# Patient Record
Sex: FEMALE | Race: BLACK OR AFRICAN AMERICAN | NOT HISPANIC OR LATINO | URBAN - METROPOLITAN AREA
[De-identification: names, ages, dates, MRNs, and addresses within clinical notes are randomized per-mention and may not be internally consistent; named-entity substitution may affect disease eponyms.]

---

## 2019-02-01 ENCOUNTER — INPATIENT (INPATIENT)
Facility: HOSPITAL | Age: 64
LOS: 6 days | Discharge: HOME HEALTH | End: 2019-02-08
Attending: INTERNAL MEDICINE | Admitting: INTERNAL MEDICINE
Payer: COMMERCIAL

## 2019-02-01 VITALS
OXYGEN SATURATION: 100 % | TEMPERATURE: 99 F | HEART RATE: 79 BPM | DIASTOLIC BLOOD PRESSURE: 82 MMHG | RESPIRATION RATE: 16 BRPM | SYSTOLIC BLOOD PRESSURE: 160 MMHG

## 2019-02-01 LAB
ALBUMIN SERPL ELPH-MCNC: 4.4 G/DL — SIGNIFICANT CHANGE UP (ref 3.3–5)
ALP SERPL-CCNC: 63 U/L — SIGNIFICANT CHANGE UP (ref 40–120)
ALT FLD-CCNC: 16 U/L — SIGNIFICANT CHANGE UP (ref 4–33)
ANION GAP SERPL CALC-SCNC: 13 MMO/L — SIGNIFICANT CHANGE UP (ref 7–14)
AST SERPL-CCNC: 16 U/L — SIGNIFICANT CHANGE UP (ref 4–32)
BASOPHILS # BLD AUTO: 0.04 K/UL — SIGNIFICANT CHANGE UP (ref 0–0.2)
BASOPHILS NFR BLD AUTO: 0.3 % — SIGNIFICANT CHANGE UP (ref 0–2)
BILIRUB SERPL-MCNC: 0.3 MG/DL — SIGNIFICANT CHANGE UP (ref 0.2–1.2)
BUN SERPL-MCNC: 16 MG/DL — SIGNIFICANT CHANGE UP (ref 7–23)
CALCIUM SERPL-MCNC: 9.9 MG/DL — SIGNIFICANT CHANGE UP (ref 8.4–10.5)
CHLORIDE SERPL-SCNC: 103 MMOL/L — SIGNIFICANT CHANGE UP (ref 98–107)
CO2 SERPL-SCNC: 25 MMOL/L — SIGNIFICANT CHANGE UP (ref 22–31)
CREAT SERPL-MCNC: 0.65 MG/DL — SIGNIFICANT CHANGE UP (ref 0.5–1.3)
EOSINOPHIL # BLD AUTO: 0.05 K/UL — SIGNIFICANT CHANGE UP (ref 0–0.5)
EOSINOPHIL NFR BLD AUTO: 0.4 % — SIGNIFICANT CHANGE UP (ref 0–6)
FLU A RESULT: NOT DETECTED — SIGNIFICANT CHANGE UP
FLU A RESULT: NOT DETECTED — SIGNIFICANT CHANGE UP
FLUAV AG NPH QL: NOT DETECTED — SIGNIFICANT CHANGE UP
FLUBV AG NPH QL: NOT DETECTED — SIGNIFICANT CHANGE UP
GLUCOSE SERPL-MCNC: 165 MG/DL — HIGH (ref 70–99)
HCT VFR BLD CALC: 38.4 % — SIGNIFICANT CHANGE UP (ref 34.5–45)
HGB BLD-MCNC: 12.2 G/DL — SIGNIFICANT CHANGE UP (ref 11.5–15.5)
IMM GRANULOCYTES NFR BLD AUTO: 0.4 % — SIGNIFICANT CHANGE UP (ref 0–1.5)
LYMPHOCYTES # BLD AUTO: 1.43 K/UL — SIGNIFICANT CHANGE UP (ref 1–3.3)
LYMPHOCYTES # BLD AUTO: 11.8 % — LOW (ref 13–44)
MCHC RBC-ENTMCNC: 28 PG — SIGNIFICANT CHANGE UP (ref 27–34)
MCHC RBC-ENTMCNC: 31.8 % — LOW (ref 32–36)
MCV RBC AUTO: 88.1 FL — SIGNIFICANT CHANGE UP (ref 80–100)
MONOCYTES # BLD AUTO: 0.57 K/UL — SIGNIFICANT CHANGE UP (ref 0–0.9)
MONOCYTES NFR BLD AUTO: 4.7 % — SIGNIFICANT CHANGE UP (ref 2–14)
NEUTROPHILS # BLD AUTO: 9.95 K/UL — HIGH (ref 1.8–7.4)
NEUTROPHILS NFR BLD AUTO: 82.4 % — HIGH (ref 43–77)
NRBC # FLD: 0 K/UL — LOW (ref 25–125)
PLATELET # BLD AUTO: 275 K/UL — SIGNIFICANT CHANGE UP (ref 150–400)
PMV BLD: 11 FL — SIGNIFICANT CHANGE UP (ref 7–13)
POTASSIUM SERPL-MCNC: 3.3 MMOL/L — LOW (ref 3.5–5.3)
POTASSIUM SERPL-SCNC: 3.3 MMOL/L — LOW (ref 3.5–5.3)
PROT SERPL-MCNC: 7.6 G/DL — SIGNIFICANT CHANGE UP (ref 6–8.3)
RBC # BLD: 4.36 M/UL — SIGNIFICANT CHANGE UP (ref 3.8–5.2)
RBC # FLD: 14.9 % — HIGH (ref 10.3–14.5)
RSV RESULT: SIGNIFICANT CHANGE UP
RSV RNA RESP QL NAA+PROBE: SIGNIFICANT CHANGE UP
SODIUM SERPL-SCNC: 141 MMOL/L — SIGNIFICANT CHANGE UP (ref 135–145)
WBC # BLD: 12.09 K/UL — HIGH (ref 3.8–10.5)
WBC # FLD AUTO: 12.09 K/UL — HIGH (ref 3.8–10.5)

## 2019-02-01 RX ORDER — MECLIZINE HCL 12.5 MG
25 TABLET ORAL ONCE
Qty: 0 | Refills: 0 | Status: COMPLETED | OUTPATIENT
Start: 2019-02-01 | End: 2019-02-01

## 2019-02-01 RX ORDER — ONDANSETRON 8 MG/1
4 TABLET, FILM COATED ORAL ONCE
Qty: 0 | Refills: 0 | Status: COMPLETED | OUTPATIENT
Start: 2019-02-01 | End: 2019-02-01

## 2019-02-01 RX ORDER — METOCLOPRAMIDE HCL 10 MG
10 TABLET ORAL ONCE
Qty: 0 | Refills: 0 | Status: COMPLETED | OUTPATIENT
Start: 2019-02-01 | End: 2019-02-01

## 2019-02-01 RX ORDER — SODIUM CHLORIDE 9 MG/ML
1000 INJECTION INTRAMUSCULAR; INTRAVENOUS; SUBCUTANEOUS ONCE
Qty: 0 | Refills: 0 | Status: COMPLETED | OUTPATIENT
Start: 2019-02-01 | End: 2019-02-01

## 2019-02-01 RX ADMIN — ONDANSETRON 4 MILLIGRAM(S): 8 TABLET, FILM COATED ORAL at 22:08

## 2019-02-01 RX ADMIN — Medication 25 MILLIGRAM(S): at 22:08

## 2019-02-01 RX ADMIN — Medication 25 MILLIGRAM(S): at 23:48

## 2019-02-01 RX ADMIN — Medication 25 MILLIGRAM(S): at 20:20

## 2019-02-01 RX ADMIN — ONDANSETRON 4 MILLIGRAM(S): 8 TABLET, FILM COATED ORAL at 23:48

## 2019-02-01 RX ADMIN — Medication 10 MILLIGRAM(S): at 23:48

## 2019-02-01 RX ADMIN — SODIUM CHLORIDE 1000 MILLILITER(S): 9 INJECTION INTRAMUSCULAR; INTRAVENOUS; SUBCUTANEOUS at 20:21

## 2019-02-01 NOTE — ED PROVIDER NOTE - ATTENDING CONTRIBUTION TO CARE
Pt was evaluated at another hospital this AM for elevated BP that was treated with norvasc, she was discharged and was having her hair done when she felt the room spinning around her and became very nauseated and was brought here. On exam, aaox3, vitals noted. CN II-XII grossly intact, no pronator drift, normal finger to nose, 5/5 strength in all 4 ext, sensation intact, no carotid bruit, will send labs, ct, ekg, medicate and reassess.

## 2019-02-01 NOTE — ED ADULT NURSE NOTE - OBJECTIVE STATEMENT
patient alert ox3 came in c/o dizziness. not in any distress. labs done as ordered. meds given . awaiting on results and re eval

## 2019-02-01 NOTE — ED ADULT TRIAGE NOTE - CHIEF COMPLAINT QUOTE
Pt. was seen at [facility] for weakness, vertigo and HTN earlier this AM. States she felt dizzy while shopping at [employer] after being discharged. Started on Losartan today. No LOC. Denies cp, palpitations or sob.

## 2019-02-01 NOTE — ED PROVIDER NOTE - PROGRESS NOTE DETAILS
patient remains symptomatic despite multiple rounds of medications. will consult neurology. - resident Rob Corey patient remains symptomatic. will get cta head. second page to neurology. - resident Rob Corey patient seen by neuro. positive hints exam so symptoms likely peripheral. patient reports symptoms moderately improved. agrees to stay for further symptom control and MRI. - resident Rob Corey

## 2019-02-01 NOTE — ED PROVIDER NOTE - PHYSICAL EXAMINATION
General: uncomfortable appearing female, no acute distress   HEENT: PERRL, EOMI, bilateral TMs clear, oropharynx without exudate or erythema   Respiratory: normal work of breathing, lungs clear to auscultation bilaterally   Cardiac: regular rate and rhythm   Abdomen: soft, non-tender   MSk: no swelling or tenderness of lower extremities   Skin: no rashes   Neuro: A&Ox3, 5/5 strength in all extremities, no sensory deficits

## 2019-02-01 NOTE — ED ADULT NURSE NOTE - NSIMPLEMENTINTERV_GEN_ALL_ED
Implemented All Universal Safety Interventions:  [place] to call system. Call bell, personal items and telephone within reach. Instruct patient to call for assistance. Room bathroom lighting operational. Non-slip footwear when patient is off stretcher. Physically safe environment: no spills, clutter or unnecessary equipment. Stretcher in lowest position, wheels locked, appropriate side rails in place.

## 2019-02-01 NOTE — ED ADULT NURSE NOTE - CHIEF COMPLAINT QUOTE
Pt. was seen at [facility] for weakness, vertigo and HTN earlier this AM. States she felt dizzy while shopping at [employer] after being discharged. Started on Losartan today. No LOC. Denies cp, palpitations or sob.

## 2019-02-01 NOTE — ED PROVIDER NOTE - OBJECTIVE STATEMENT
63F, pmh of HTN presenting with dizziness. Patient has been feeling weak, fatigued and had body aches for the last several days. Found her blood pressure to be elevated so went to PCP who started her on Losartan. Today she felt unwell so went to [facility]. Was found to be flu negative, given IV fluids and discharged after symptoms improved. In the afternoon suddenly became dizzy (room spinning) and had vomiting. Symptoms worsen with movement. Denies fever, changes in vision, hearing loss, headache, chest pain, difficulty breathing, cough, abdominal pain, pain or burning with urination, pain or swelling in lower extremities.

## 2019-02-01 NOTE — ED PROVIDER NOTE - MEDICAL DECISION MAKING DETAILS
63F presenting with dizziness. sudden onset of symptoms, associated vomiting. body aches and general fatigue for several days. low concern for central etiology of vertigo. likely peripheral vertigo vs viral illness. plan for cbc, cmp, normal saline, symptom control. will reassess.

## 2019-02-02 DIAGNOSIS — S37.591A: Chronic | ICD-10-CM

## 2019-02-02 DIAGNOSIS — Z98.890 OTHER SPECIFIED POSTPROCEDURAL STATES: Chronic | ICD-10-CM

## 2019-02-02 LAB
APPEARANCE UR: CLEAR — SIGNIFICANT CHANGE UP
BACTERIA # UR AUTO: NEGATIVE — SIGNIFICANT CHANGE UP
BILIRUB UR-MCNC: NEGATIVE — SIGNIFICANT CHANGE UP
BLOOD UR QL VISUAL: NEGATIVE — SIGNIFICANT CHANGE UP
COLOR SPEC: SIGNIFICANT CHANGE UP
GLUCOSE UR-MCNC: NEGATIVE — SIGNIFICANT CHANGE UP
HYALINE CASTS # UR AUTO: HIGH
KETONES UR-MCNC: SIGNIFICANT CHANGE UP
LEUKOCYTE ESTERASE UR-ACNC: SIGNIFICANT CHANGE UP
NITRITE UR-MCNC: NEGATIVE — SIGNIFICANT CHANGE UP
PH UR: 6.5 — SIGNIFICANT CHANGE UP (ref 5–8)
PROT UR-MCNC: 10 — SIGNIFICANT CHANGE UP
RBC CASTS # UR COMP ASSIST: SIGNIFICANT CHANGE UP (ref 0–?)
SP GR SPEC: 1.02 — SIGNIFICANT CHANGE UP (ref 1–1.04)
SQUAMOUS # UR AUTO: SIGNIFICANT CHANGE UP
UROBILINOGEN FLD QL: NORMAL — SIGNIFICANT CHANGE UP
WBC UR QL: >50 — HIGH (ref 0–?)

## 2019-02-02 PROCEDURE — 70551 MRI BRAIN STEM W/O DYE: CPT | Mod: 26

## 2019-02-02 PROCEDURE — 70496 CT ANGIOGRAPHY HEAD: CPT | Mod: 26

## 2019-02-02 PROCEDURE — 70498 CT ANGIOGRAPHY NECK: CPT | Mod: 26

## 2019-02-02 RX ORDER — DIAZEPAM 5 MG
2 TABLET ORAL ONCE
Qty: 0 | Refills: 0 | Status: DISCONTINUED | OUTPATIENT
Start: 2019-02-02 | End: 2019-02-02

## 2019-02-02 RX ORDER — ONDANSETRON 8 MG/1
4 TABLET, FILM COATED ORAL EVERY 4 HOURS
Qty: 0 | Refills: 0 | Status: DISCONTINUED | OUTPATIENT
Start: 2019-02-02 | End: 2019-02-08

## 2019-02-02 RX ORDER — ATORVASTATIN CALCIUM 80 MG/1
80 TABLET, FILM COATED ORAL DAILY
Qty: 0 | Refills: 0 | Status: DISCONTINUED | OUTPATIENT
Start: 2019-02-02 | End: 2019-02-08

## 2019-02-02 RX ORDER — POTASSIUM CHLORIDE 20 MEQ
40 PACKET (EA) ORAL ONCE
Qty: 0 | Refills: 0 | Status: COMPLETED | OUTPATIENT
Start: 2019-02-02 | End: 2019-02-02

## 2019-02-02 RX ORDER — MECLIZINE HCL 12.5 MG
25 TABLET ORAL ONCE
Qty: 0 | Refills: 0 | Status: COMPLETED | OUTPATIENT
Start: 2019-02-02 | End: 2019-02-02

## 2019-02-02 RX ORDER — METOCLOPRAMIDE HCL 10 MG
10 TABLET ORAL ONCE
Qty: 0 | Refills: 0 | Status: COMPLETED | OUTPATIENT
Start: 2019-02-02 | End: 2019-02-02

## 2019-02-02 RX ORDER — DIAZEPAM 5 MG
5 TABLET ORAL EVERY 6 HOURS
Qty: 0 | Refills: 0 | Status: DISCONTINUED | OUTPATIENT
Start: 2019-02-02 | End: 2019-02-04

## 2019-02-02 RX ORDER — MECLIZINE HCL 12.5 MG
25 TABLET ORAL EVERY 6 HOURS
Qty: 0 | Refills: 0 | Status: DISCONTINUED | OUTPATIENT
Start: 2019-02-02 | End: 2019-02-04

## 2019-02-02 RX ORDER — LOSARTAN POTASSIUM 100 MG/1
50 TABLET, FILM COATED ORAL DAILY
Qty: 0 | Refills: 0 | Status: DISCONTINUED | OUTPATIENT
Start: 2019-02-02 | End: 2019-02-05

## 2019-02-02 RX ORDER — HYDROCHLOROTHIAZIDE 25 MG
12.5 TABLET ORAL DAILY
Qty: 0 | Refills: 0 | Status: DISCONTINUED | OUTPATIENT
Start: 2019-02-02 | End: 2019-02-05

## 2019-02-02 RX ORDER — ASPIRIN/CALCIUM CARB/MAGNESIUM 324 MG
81 TABLET ORAL DAILY
Qty: 0 | Refills: 0 | Status: DISCONTINUED | OUTPATIENT
Start: 2019-02-02 | End: 2019-02-08

## 2019-02-02 RX ORDER — DIAZEPAM 5 MG
5 TABLET ORAL ONCE
Qty: 0 | Refills: 0 | Status: DISCONTINUED | OUTPATIENT
Start: 2019-02-02 | End: 2019-02-02

## 2019-02-02 RX ORDER — METOPROLOL TARTRATE 50 MG
100 TABLET ORAL DAILY
Qty: 0 | Refills: 0 | Status: DISCONTINUED | OUTPATIENT
Start: 2019-02-02 | End: 2019-02-06

## 2019-02-02 RX ADMIN — Medication 25 MILLIGRAM(S): at 08:38

## 2019-02-02 RX ADMIN — Medication 0.5 MILLIGRAM(S): at 19:08

## 2019-02-02 RX ADMIN — Medication 10 MILLIGRAM(S): at 02:21

## 2019-02-02 RX ADMIN — Medication 5 MILLIGRAM(S): at 00:52

## 2019-02-02 RX ADMIN — Medication 2 MILLIGRAM(S): at 02:43

## 2019-02-02 RX ADMIN — Medication 12.5 MILLIGRAM(S): at 09:13

## 2019-02-02 RX ADMIN — Medication 81 MILLIGRAM(S): at 12:50

## 2019-02-02 RX ADMIN — ATORVASTATIN CALCIUM 80 MILLIGRAM(S): 80 TABLET, FILM COATED ORAL at 12:50

## 2019-02-02 RX ADMIN — LOSARTAN POTASSIUM 50 MILLIGRAM(S): 100 TABLET, FILM COATED ORAL at 09:12

## 2019-02-02 RX ADMIN — Medication 5 MILLIGRAM(S): at 21:21

## 2019-02-02 RX ADMIN — ONDANSETRON 4 MILLIGRAM(S): 8 TABLET, FILM COATED ORAL at 08:38

## 2019-02-02 RX ADMIN — Medication 40 MILLIEQUIVALENT(S): at 08:38

## 2019-02-02 RX ADMIN — Medication 100 MILLIGRAM(S): at 09:12

## 2019-02-02 RX ADMIN — Medication 25 MILLIGRAM(S): at 21:21

## 2019-02-02 RX ADMIN — Medication 25 MILLIGRAM(S): at 00:52

## 2019-02-02 RX ADMIN — Medication 0.5 MILLIGRAM(S): at 08:39

## 2019-02-02 NOTE — ED CDU PROVIDER INITIAL DAY NOTE - FAMILY HISTORY
Mother  Still living? No  Family history of Alzheimer's disease, Age at diagnosis: Age Unknown     Father  Still living? No  Family history of substance abuse, Age at diagnosis: Age Unknown

## 2019-02-02 NOTE — ED CDU PROVIDER INITIAL DAY NOTE - PSH
Other injury of one fallopian tube  sp revision procedure  S/P D&C (status post dilation and curettage)  termination of pregnancy

## 2019-02-02 NOTE — CONSULT NOTE ADULT - ASSESSMENT
63y RH F PMH HTN p/w vertigo. NIHSS 0, MRS 0. PE w/ L beating nystagmus looking in all directions, dizziness worsens when looking L, head thrust +. Patient received multiple doses of meclizine and valium without relief of symptoms. HINTs exam + would indicate that vertigo is less likely 2/2 to stroke, more likely 2/2 to peripheral cause; however, given severity of symptoms without relief in the setting of acute hypertensive episodes, would be reasonable to obtain additional imaging studies to rule out neurovascular etiology of symptoms.     Recs:  CT head w/o contrast, CTA H/N  If CT head w/o contrast negative for bleed, would give aspirin 81mg x1  Atorvastatin 80mg qD  A1c, LDL  MRI head w/o contrast  Continue valium

## 2019-02-02 NOTE — CONSULT NOTE ADULT - SUBJECTIVE AND OBJECTIVE BOX
Neurology Consult    63y RH F PMH HTN p/w vertigo. LKW 1/31 0900. As per patient, on 1/31, started feeling unwell, had headache, nausea, dizziness. Went to PCP's office where her pressure was found to be 220/120; PCP increased pt's metoprolol succinate from 50 to 100mg and gave her a dose of losartan 50mg and HCTZ 12.5 mg. On 2/1 in the AM, patient continued to feel unwell, took 100mg metoprolol succinate and went to [facility] where her BP was once again found elevated; was given norvasc(dose unknown), felt better, and was discharged. Patient then went to [facility] when she felt worsening dizziness/vertigo(room spinning around her), called an ambulance which brought her to [facility].     REVIEW OF SYSTEMS:  As above    MEDICATIONS  diazepam  Injectable 2 milliGRAM(s) IV Push Once  metoclopramide Injectable 10 milliGRAM(s) IV Push once    PMH: Hypertension    PSH:     FAMILY HISTORY:    No history of dementia, strokes, or seizures     SOCIAL HISTORY:  No history of tobacco or alcohol use     Allergies    Flagyl (Rash)    Intolerances    Vital Signs Last 24 Hrs  T(C): 36.5 (02 Feb 2019 01:01), Max: 37.2 (01 Feb 2019 18:44)  T(F): 97.7 (02 Feb 2019 01:01), Max: 98.9 (01 Feb 2019 18:44)  HR: 92 (02 Feb 2019 01:01) (79 - 92)  BP: 174/91 (02 Feb 2019 01:01) (160/82 - 174/91)  BP(mean): --  RR: 17 (02 Feb 2019 01:01) (16 - 17)  SpO2: 98% (02 Feb 2019 01:01) (98% - 100%)    Neurological Examination:    Mental Status: Patient is alert, awake, oriented X3. Patient is fluent, no dysarthria, no aphasia. Follows commands well and able to answer questions appropriately. Mood and affect normal.    Cranial Nerves: PERRL, EOMI with dizziness that worsens when looking L, L beating nystagmus when looking in all directions, no skew deviation, L head thrust positive, visual field intact, V1-V3 intact, no gross facial asymmetry, tongue/uvula midline    Not compliant w/ dony hallpike(too dizzy to sit up)    Motor Exam: No drift  Right upper extremity: 5/5  Left upper extremity: 5/5  Right lower extremity: 5/5  Left lower extremity: 5/5    Normal bulk/tone    Sensory: Intact to light touch bilaterally. No extinction    Coordination: Finger to nose intact bilaterally     Reflexes: Bilateral 2+ Biceps, Brachial, Patellar, Ankle  Plantar: Down bilateral    GENERAL: No acute distress  HEENT:  Normocephalic, atraumatic  EXTREMITIES: No edema, clubbing, cyanosis  MUSCULOSKELETAL: Normal range of motion  SKIN: No rashes    LABS:  CBC Full  -  ( 01 Feb 2019 20:05 )  WBC Count : 12.09 K/uL  Hemoglobin : 12.2 g/dL  Hematocrit : 38.4 %  Platelet Count - Automated : 275 K/uL  Mean Cell Volume : 88.1 fL  Mean Cell Hemoglobin : 28.0 pg  Mean Cell Hemoglobin Concentration : 31.8 %  Auto Neutrophil # : 9.95 K/uL  Auto Lymphocyte # : 1.43 K/uL  Auto Monocyte # : 0.57 K/uL  Auto Eosinophil # : 0.05 K/uL  Auto Basophil # : 0.04 K/uL  Auto Neutrophil % : 82.4 %  Auto Lymphocyte % : 11.8 %  Auto Monocyte % : 4.7 %  Auto Eosinophil % : 0.4 %  Auto Basophil % : 0.3 %      02-01    141  |  103  |  16  ----------------------------<  165<H>  3.3<L>   |  25  |  0.65    Ca    9.9      01 Feb 2019 20:05    TPro  7.6  /  Alb  4.4  /  TBili  0.3  /  DBili  x   /  AST  16  /  ALT  16  /  AlkPhos  63  02-01    LIVER FUNCTIONS - ( 01 Feb 2019 20:05 )  Alb: 4.4 g/dL / Pro: 7.6 g/dL / ALK PHOS: 63 u/L / ALT: 16 u/L / AST: 16 u/L / GGT: x           Hemoglobin A1C:           RADIOLOGY:  CT head:  MRI:

## 2019-02-02 NOTE — ED CDU PROVIDER INITIAL DAY NOTE - PROGRESS NOTE DETAILS
Patient states she is till having dizziness this am. States it has not improved, Exam: heart- RRR s1s2 nl  Lungs - clear bilaterally  abd - soft NT ND  extrem- no edema or cyanosis   Plan for MRI today. Will offer medication to assist with patient's claustrophobia. Await MRI results

## 2019-02-02 NOTE — CONSULT NOTE ADULT - ATTENDING COMMENTS
I performed a history and physical examination of the patient and discussed the management of the patient with the resident. I reviewed the resident's note and agree with the documented findings and plan of care with the following additions/exceptions.    DOS 2/2/19    continues to feel dizzy this morning. alert, fluent, face symmetric, limbs symmteric. nystagmus on left gaze and head thrust test with unilateral abnormal corrective saccade on left, suggesting could be peripheral etiology however with her history need to be cautious and r/o central cause like stroke. she went for mri but was too claustrophobic to get it, agree with team trying again.

## 2019-02-02 NOTE — ED CDU PROVIDER INITIAL DAY NOTE - OBJECTIVE STATEMENT
Patient is a 63 year old female who states that yesterday around 530 pm she was at the hair salon seated in the chair, when she suddenly developed dizziness. States she had to ask the staff for assistance to get to her car. while sitting in her car she states she called 911 / ambulance who brought her to the hospital ER. States she had a similar episode 1 year ago but that episode was very brief and lasted only a few minutes. It resolved on its own. States that the dizziness has been continuous since it started yesterday.

## 2019-02-02 NOTE — ED CDU PROVIDER INITIAL DAY NOTE - ATTENDING CONTRIBUTION TO CARE
64 yo F pmh of HTN presented with vertigo and dizziness. Patient has been feeling weak, fatigued and had body aches for the last several days. Found her blood pressure to be elevated so went to PCP who started her on Losartan. Was seen at [facility] earlier, was found to be flu negative, given IV fluids and discharged after symptoms improved. Vertigo worsened with associated vomiting and so she came to the ER.   In the ER K 3.3, WBC 12, labs otherwise normal. CTH, and CTA H/N obtained which were negative. she received several rounds of valium/meclizine/reglan/zofran in the ER with continued vertigo. Neuro was consulted who recommended MRI brain.   In the CDU this morning patient reports stable symptoms. K repleted. UA ordered. re-spoke to neuro, they rec'd MRI brain without contrast, no MRA. therefore I amended the MRI orders. will continue to monitor patient, medicate as needed, and follow up MRI results 62 yo F pmh of HTN presented with vertigo and dizziness. Patient has been feeling weak, fatigued and had body aches for the last several days. Found her blood pressure to be elevated so went to PCP who started her on Losartan. Was seen at [facility] earlier, was found to be flu negative, given IV fluids and discharged after symptoms improved. Vertigo worsened with associated vomiting and so she came to the ER.   In the ER K 3.3, WBC 12, labs otherwise normal. CTH, and CTA H/N obtained which were negative. she received several rounds of valium/meclizine/reglan/zofran in the ER with continued vertigo. Neuro was consulted who recommended MRI brain.   In the CDU this morning patient reports continued vertigo and nausea. vertigo is worse with eyes open, is present constantly irregardless of head movement. further meds ordered. K repleted. UA ordered. re-spoke to neuro, they rec'd MRI brain without contrast, no MRA. therefore I amended the MRI orders. will continue to monitor patient, medicate as needed, and follow up MRI results

## 2019-02-02 NOTE — ED ADULT NURSE REASSESSMENT NOTE - NS ED NURSE REASSESS COMMENT FT1
Patient asleep, appears comfortable and in no apparent distress.  Awaiting CDU consult.  Will continue to monitor patient.

## 2019-02-03 DIAGNOSIS — Z29.9 ENCOUNTER FOR PROPHYLACTIC MEASURES, UNSPECIFIED: ICD-10-CM

## 2019-02-03 DIAGNOSIS — I10 ESSENTIAL (PRIMARY) HYPERTENSION: ICD-10-CM

## 2019-02-03 DIAGNOSIS — R42 DIZZINESS AND GIDDINESS: ICD-10-CM

## 2019-02-03 DIAGNOSIS — R82.90 UNSPECIFIED ABNORMAL FINDINGS IN URINE: ICD-10-CM

## 2019-02-03 LAB
ALBUMIN SERPL ELPH-MCNC: 4 G/DL — SIGNIFICANT CHANGE UP (ref 3.3–5)
ALP SERPL-CCNC: 59 U/L — SIGNIFICANT CHANGE UP (ref 40–120)
ALT FLD-CCNC: 12 U/L — SIGNIFICANT CHANGE UP (ref 4–33)
ANION GAP SERPL CALC-SCNC: 13 MMO/L — SIGNIFICANT CHANGE UP (ref 7–14)
APPEARANCE UR: SIGNIFICANT CHANGE UP
AST SERPL-CCNC: 15 U/L — SIGNIFICANT CHANGE UP (ref 4–32)
BACTERIA # UR AUTO: NEGATIVE — SIGNIFICANT CHANGE UP
BASOPHILS # BLD AUTO: 0.04 K/UL — SIGNIFICANT CHANGE UP (ref 0–0.2)
BASOPHILS NFR BLD AUTO: 0.5 % — SIGNIFICANT CHANGE UP (ref 0–2)
BILIRUB SERPL-MCNC: 0.6 MG/DL — SIGNIFICANT CHANGE UP (ref 0.2–1.2)
BILIRUB UR-MCNC: NEGATIVE — SIGNIFICANT CHANGE UP
BLOOD UR QL VISUAL: NEGATIVE — SIGNIFICANT CHANGE UP
BUN SERPL-MCNC: 21 MG/DL — SIGNIFICANT CHANGE UP (ref 7–23)
CALCIUM SERPL-MCNC: 10.1 MG/DL — SIGNIFICANT CHANGE UP (ref 8.4–10.5)
CHLORIDE SERPL-SCNC: 102 MMOL/L — SIGNIFICANT CHANGE UP (ref 98–107)
CHOLEST SERPL-MCNC: 198 MG/DL — SIGNIFICANT CHANGE UP (ref 120–199)
CO2 SERPL-SCNC: 24 MMOL/L — SIGNIFICANT CHANGE UP (ref 22–31)
COLOR SPEC: YELLOW — SIGNIFICANT CHANGE UP
CREAT SERPL-MCNC: 0.9 MG/DL — SIGNIFICANT CHANGE UP (ref 0.5–1.3)
EOSINOPHIL # BLD AUTO: 0.06 K/UL — SIGNIFICANT CHANGE UP (ref 0–0.5)
EOSINOPHIL NFR BLD AUTO: 0.7 % — SIGNIFICANT CHANGE UP (ref 0–6)
GLUCOSE SERPL-MCNC: 117 MG/DL — HIGH (ref 70–99)
GLUCOSE UR-MCNC: NEGATIVE — SIGNIFICANT CHANGE UP
HBA1C BLD-MCNC: 6.4 % — HIGH (ref 4–5.6)
HCT VFR BLD CALC: 41.2 % — SIGNIFICANT CHANGE UP (ref 34.5–45)
HDLC SERPL-MCNC: 71 MG/DL — HIGH (ref 45–65)
HGB BLD-MCNC: 12.8 G/DL — SIGNIFICANT CHANGE UP (ref 11.5–15.5)
HYALINE CASTS # UR AUTO: HIGH
IMM GRANULOCYTES NFR BLD AUTO: 0.5 % — SIGNIFICANT CHANGE UP (ref 0–1.5)
KETONES UR-MCNC: SIGNIFICANT CHANGE UP
LEUKOCYTE ESTERASE UR-ACNC: SIGNIFICANT CHANGE UP
LIPID PNL WITH DIRECT LDL SERPL: 122 MG/DL — SIGNIFICANT CHANGE UP
LYMPHOCYTES # BLD AUTO: 2.07 K/UL — SIGNIFICANT CHANGE UP (ref 1–3.3)
LYMPHOCYTES # BLD AUTO: 25.2 % — SIGNIFICANT CHANGE UP (ref 13–44)
MCHC RBC-ENTMCNC: 27.3 PG — SIGNIFICANT CHANGE UP (ref 27–34)
MCHC RBC-ENTMCNC: 31.1 % — LOW (ref 32–36)
MCV RBC AUTO: 87.8 FL — SIGNIFICANT CHANGE UP (ref 80–100)
MONOCYTES # BLD AUTO: 0.51 K/UL — SIGNIFICANT CHANGE UP (ref 0–0.9)
MONOCYTES NFR BLD AUTO: 6.2 % — SIGNIFICANT CHANGE UP (ref 2–14)
NEUTROPHILS # BLD AUTO: 5.49 K/UL — SIGNIFICANT CHANGE UP (ref 1.8–7.4)
NEUTROPHILS NFR BLD AUTO: 66.9 % — SIGNIFICANT CHANGE UP (ref 43–77)
NITRITE UR-MCNC: NEGATIVE — SIGNIFICANT CHANGE UP
NRBC # FLD: 0 K/UL — LOW (ref 25–125)
PH UR: 6 — SIGNIFICANT CHANGE UP (ref 5–8)
PLATELET # BLD AUTO: 310 K/UL — SIGNIFICANT CHANGE UP (ref 150–400)
PMV BLD: 11.7 FL — SIGNIFICANT CHANGE UP (ref 7–13)
POTASSIUM SERPL-MCNC: 3.6 MMOL/L — SIGNIFICANT CHANGE UP (ref 3.5–5.3)
POTASSIUM SERPL-SCNC: 3.6 MMOL/L — SIGNIFICANT CHANGE UP (ref 3.5–5.3)
PROT SERPL-MCNC: 7.1 G/DL — SIGNIFICANT CHANGE UP (ref 6–8.3)
PROT UR-MCNC: 50 — SIGNIFICANT CHANGE UP
RBC # BLD: 4.69 M/UL — SIGNIFICANT CHANGE UP (ref 3.8–5.2)
RBC # FLD: 15.2 % — HIGH (ref 10.3–14.5)
RBC CASTS # UR COMP ASSIST: SIGNIFICANT CHANGE UP (ref 0–?)
SODIUM SERPL-SCNC: 139 MMOL/L — SIGNIFICANT CHANGE UP (ref 135–145)
SP GR SPEC: 1.03 — SIGNIFICANT CHANGE UP (ref 1–1.04)
SQUAMOUS # UR AUTO: SIGNIFICANT CHANGE UP
TRIGL SERPL-MCNC: 108 MG/DL — SIGNIFICANT CHANGE UP (ref 10–149)
UROBILINOGEN FLD QL: SIGNIFICANT CHANGE UP
WBC # BLD: 8.21 K/UL — SIGNIFICANT CHANGE UP (ref 3.8–10.5)
WBC # FLD AUTO: 8.21 K/UL — SIGNIFICANT CHANGE UP (ref 3.8–10.5)
WBC UR QL: HIGH (ref 0–?)
YEAST BUDDING # UR COMP ASSIST: SIGNIFICANT CHANGE UP

## 2019-02-03 PROCEDURE — 99223 1ST HOSP IP/OBS HIGH 75: CPT

## 2019-02-03 RX ORDER — CEPHALEXIN 500 MG
500 CAPSULE ORAL
Qty: 0 | Refills: 0 | Status: DISCONTINUED | OUTPATIENT
Start: 2019-02-03 | End: 2019-02-05

## 2019-02-03 RX ORDER — HEPARIN SODIUM 5000 [USP'U]/ML
5000 INJECTION INTRAVENOUS; SUBCUTANEOUS EVERY 8 HOURS
Qty: 0 | Refills: 0 | Status: DISCONTINUED | OUTPATIENT
Start: 2019-02-03 | End: 2019-02-08

## 2019-02-03 RX ADMIN — Medication 25 MILLIGRAM(S): at 06:47

## 2019-02-03 RX ADMIN — Medication 500 MILLIGRAM(S): at 06:47

## 2019-02-03 RX ADMIN — HEPARIN SODIUM 5000 UNIT(S): 5000 INJECTION INTRAVENOUS; SUBCUTANEOUS at 21:04

## 2019-02-03 RX ADMIN — Medication 5 MILLIGRAM(S): at 06:46

## 2019-02-03 RX ADMIN — ATORVASTATIN CALCIUM 80 MILLIGRAM(S): 80 TABLET, FILM COATED ORAL at 12:08

## 2019-02-03 RX ADMIN — Medication 500 MILLIGRAM(S): at 17:51

## 2019-02-03 RX ADMIN — Medication 81 MILLIGRAM(S): at 12:08

## 2019-02-03 RX ADMIN — Medication 25 MILLIGRAM(S): at 17:51

## 2019-02-03 NOTE — ED CDU PROVIDER SUBSEQUENT DAY NOTE - HISTORY
62 yo female, PMH of HTN and claustrophobia, presented to the ED for acute but gradually worsening onset of room-spinning dizziness x 1 day.  No past hx/o similar.  Pt. was evaluated in the ED and Neuro was consulted; CTA head/neck negative for acute pathology; pt dispo'd to CDU for MRI head and continued supportive care / neuro checks / Neuro team reassessment.  In the interim, MRI completed (official radiology results pending); pt still having room-spinning dizziness aggravated by opening eyes, head movement, general movement; pt has not been able to ambulate unassisted thus far due to dizziness.  Also of note, UA (checked x 2) positive for large leukocytes; pt started on Keflex and UCX sent to lab.  No other issues in the interim.

## 2019-02-03 NOTE — ED CDU PROVIDER SUBSEQUENT DAY NOTE - MEDICAL DECISION MAKING DETAILS
Tele monitoring, neuro checks, supportive care, follow up MRI official radiology results, Neuro team reassessment, general observation care / monitoring.

## 2019-02-03 NOTE — ED CDU PROVIDER DISPOSITION NOTE - CLINICAL COURSE
C[place]: 63 yof with sudden onset of vertigo while at [facility], not able to ambulate and came in by EMS.  Pt had Ct head and MRI and neuro eval to r/u stroke, all of which show no evidence of stroke. Labs unremarkable. Pt was given multiple doses of antivert and benzos with minimal improvement of sxs. Still complaining of nausea, no longer vomiting. Unable to walk, using a bed pan. Mesfin jaramillo attempted x2 with no improvement. Seen by Dr. Jones and pt will be admitted for further meds and treatment.

## 2019-02-03 NOTE — H&P ADULT - PROBLEM SELECTOR PLAN 1
Patient with persistent vertiginous symptoms despite 2 doses of meclizine. C/w Meclizine and Valium. MRI negative for acute stroke but may have ophthalmic etiology for dizziness?   - Appreciate further Neurology recommendations  - PT/OT consultation

## 2019-02-03 NOTE — ED CDU PROVIDER SUBSEQUENT DAY NOTE - NEUROLOGICAL, MLM
Alert and oriented, no focal deficits (other than OU nystagmus as above), no motor or sensory deficits.  Pt unable to ambulate due to dizziness.

## 2019-02-03 NOTE — H&P ADULT - NSHPREVIEWOFSYSTEMS_GEN_ALL_CORE
REVIEW OF SYSTEMS:    CONSTITUTIONAL: No weakness, fevers or chills  EYES/ENT: No visual changes;  No vertigo or throat pain   NECK: No pain or stiffness  RESPIRATORY: No cough, wheezing, hemoptysis; No shortness of breath  CARDIOVASCULAR: No chest pain or palpitations  GASTROINTESTINAL: No abdominal or epigastric pain. No nausea, vomiting, or hematemesis; No diarrhea or constipation. No melena or hematochezia.  GENITOURINARY: No dysuria, frequency or hematuria  NEUROLOGICAL: No numbness or weakness, (+) DIZZINESS   SKIN: No itching, burning, rashes, or lesions   All other review of systems is negative unless indicated above.

## 2019-02-03 NOTE — ED CDU PROVIDER SUBSEQUENT DAY NOTE - MUSCULOSKELETAL, MLM
Range of motion is not limited, no pitting edema noted to lower extremities.  Pt. unable to ambulate due to dizziness.

## 2019-02-03 NOTE — ED CDU PROVIDER SUBSEQUENT DAY NOTE - ATTENDING CONTRIBUTION TO CARE
[place]: 63 yof with sudden onset of vertigo while at [place], not able to ambulate and came in by EMS.  Pt had Ct head and MRI and neuro eval to r/u stroke, all of which show no evidence of stroke. Labs unremarkable. Pt was given multiple doses of antivert and benzos with minimal improvement of sxs. Still complaining of nausea, no longer vomiting. Unable to walk, using a bed pan. Mesfin jaramillo attempted x2 with no improvement. Seen by Dr. Jones and pt will be admitted for further meds and treatment.

## 2019-02-03 NOTE — H&P ADULT - PROBLEM SELECTOR PLAN 4
IMPROVE VTE Individual Risk Assessment, Score = 2, c/w hsq for dvt ppx           RISK                                                          Points  [  ] Previous VTE                                               3  [  ] Thrombophilia                                            2  [  ] Lower limb paresis/paralysis                    2    [  ] Active Cancer (in last 6 months)              2   [ x ] Immobilization > 24 hrs                             1  [  ] ICU/CCU stay > 24 hours                            1  [ x ] Age > 60                                                        1                                            Total Score _________

## 2019-02-03 NOTE — ED CDU PROVIDER SUBSEQUENT DAY NOTE - EYES, MLM
Clear bilaterally, pupils equal, round and reactive to light.  +lateral nystagmus OU in both left lateral and right lateral directions of movement.

## 2019-02-03 NOTE — ED CDU PROVIDER SUBSEQUENT DAY NOTE - MUSCULOSKELETAL NEGATIVE STATEMENT, MLM
no back pain, no acute musculoskeletal pain, + hx/o chronic posterior mid-lower neck pain but no new/different/worsening neck pain, and no focal weakness.

## 2019-02-03 NOTE — H&P ADULT - NSHPPHYSICALEXAM_GEN_ALL_CORE
Vital Signs Last 24 Hrs  T(C): 36.7 (03 Feb 2019 14:14), Max: 36.8 (02 Feb 2019 18:15)  T(F): 98 (03 Feb 2019 14:14), Max: 98.2 (02 Feb 2019 18:15)  HR: 73 (03 Feb 2019 14:14) (58 - 77)  BP: 123/62 (03 Feb 2019 14:14) (94/53 - 145/83)  BP(mean): --  RR: 15 (03 Feb 2019 14:14) (15 - 18)  SpO2: 98% (03 Feb 2019 14:14) (96% - 100%)    General: NAD, non-toxic appearing, speaking in full sentences   HEENT: EOMI, PERRLA, no conjunctival pallor, MMM, no JVD, no thyromegaly, neck supple, trachea midline  CV: S1S2 RRR no MRG  Lungs: CTA BL  Abdomen: soft NTND +BS   Extremities: No CCE +WWP  Skin/MSK: No rashes, preserved ROM on active & passive movement  Neuro: AAOx3, nonfocal

## 2019-02-03 NOTE — H&P ADULT - NSHPLABSRESULTS_GEN_ALL_CORE
CBC Full  -  ( 03 Feb 2019 05:50 )  WBC Count : 8.21 K/uL  Hemoglobin : 12.8 g/dL  Hematocrit : 41.2 %  Platelet Count - Automated : 310 K/uL  Mean Cell Volume : 87.8 fL  Mean Cell Hemoglobin : 27.3 pg  Mean Cell Hemoglobin Concentration : 31.1 %  Auto Neutrophil # : 5.49 K/uL  Auto Lymphocyte # : 2.07 K/uL  Auto Monocyte # : 0.51 K/uL  Auto Eosinophil # : 0.06 K/uL  Auto Basophil # : 0.04 K/uL  Auto Neutrophil % : 66.9 %  Auto Lymphocyte % : 25.2 %  Auto Monocyte % : 6.2 %  Auto Eosinophil % : 0.7 %  Auto Basophil % : 0.5 %    02-03    139  |  102  |  21  ----------------------------<  117<H>  3.6   |  24  |  0.90    Ca    10.1      03 Feb 2019 05:50    TPro  7.1  /  Alb  4.0  /  TBili  0.6  /  DBili  x   /  AST  15  /  ALT  12  /  AlkPhos  59  02-03    < from: MR Head No Cont (02.02.19 @ 22:06) >  Mild volume loss, nonspecific foci of hyperintense T2 and FLAIRsignal   within the white matter likely microvascular disease or migraine sequela.   Partially empty sella. No restricted diffusion, hemorrhage or midline   shift.  Slight rightward orbital gaze preference with decreased size of the left   lateral rectus muscle belly relative to the right side and left medial   rectus, suggest correlation with thyroid function and or possible slight   left lateral rectus denervation atrophy.  < end of copied text >    EKG: NSR, QTc 451    Urinalysis (02.03.19 @ 04:10)    Color: YELLOW    Urine Appearance: Lt TURBID    Glucose: NEGATIVE    Bilirubin: NEGATIVE    Ketone - Urine: TRACE    Specific Gravity: 1.028    Blood: NEGATIVE    pH - Urine: 6.0    Protein, Urine: 50    Urobilinogen: TRACE    Nitrite: NEGATIVE    Leukocyte Esterase Concentration: LARGE    Red Blood Cell - Urine: 3-5    White Blood Cell - Urine: 26-50    Hyaline Casts: 4+    Bacteria: NEGATIVE    Budding Yeast: FEW    Squamous Epithelial: FEW

## 2019-02-03 NOTE — CHART NOTE - NSCHARTNOTEFT_GEN_A_CORE
ER/CDU visit   · HPI Objective Statement: Patient is a 63 year old female who states that yesterday around 530 pm she was at the hair salon seated in the chair, when she suddenly developed dizziness. States she had to ask the staff for assistance to get to her car. while sitting in her car she states she called 911 / ambulance who brought her to the hospital ER. States she had a similar episode 1 year ago but that episode was very brief and lasted only a few minutes. It resolved on its own. States that the dizziness has been continuous since it started yesterday.  · Chief Complaint: The patient is a 63y Female complaining of dizziness.      SUBJECTIVE:     REVIEW OF SYSTEMS:     · CONSTITUTIONAL: no fever and no chills.  · EYES: no discharge, no irritation, no pain, no redness, and no visual changes.  · ENMT: Ears: no ear pain and no hearing problems. Nose: no nasal congestion and no nasal drainage.Mouth/Throat: no dysphagia, no hoarseness and no throat pain.Neck: no lumps, no pain, no stiffness and no swollen glands  · CARDIOVASCULAR: no chest pain and no edema.  · RESPIRATORY: no chest pain, no cough, and no shortness of breath.  · GASTROINTESTINAL: no abdominal pain, no bloating, no constipation, no diarrhea, no nausea and no vomiting.  · GENITOURINARY: no dysuria, no frequency, and no hematuria.  · MUSCULOSKELETAL: no back pain, no gout, no musculoskeletal pain, no neck pain, and no weakness.  · NEUROLOGICAL: - - -  · Neurological [+]: DIFFICULTY WALKING / IMBALANCE, dizziness  · PSYCHIATRIC: no known mental health issues.  · ENDOCRINE: no diabetes and no thyroid trouble.  · ALLERGIC/IMMUNOLOGIC: no dermatitis, no environmental allergies, no food allergies, no immunosuppressive disorder, and no pruritus.      Vital Signs Last 24 Hrs  T(C): 36.8 (03 Feb 2019 10:00), Max: 37.2 (02 Feb 2019 14:18)  T(F): 98.2 (03 Feb 2019 10:00), Max: 99 (02 Feb 2019 14:18)  HR: 70 (03 Feb 2019 10:00) (58 - 77)  BP: 105/56 (03 Feb 2019 10:00) (94/53 - 145/83)  BP(mean): --  RR: 16 (03 Feb 2019 10:00) (16 - 18)  SpO2: 100% (03 Feb 2019 10:00) (96% - 100%)    I&O's Summary      CAPILLARY BLOOD GLUCOSE          PHYSICAL EXAM:     · CONSTITUTIONAL: Well appearing, well nourished, awake, alert, oriented to person, place, time/situation and in no apparent distress.  · ENMT: Airway patent. Nasal mucosa clear. Mouth with normal mucosa. Throat has no vesicles, no oropharyngeal exudates and uvula is midline.  · CARDIAC: Normal rate, regular rhythm.  Heart sounds S1, S2.  No murmurs, rubs or gallops.  · GASTROINTESTINAL: Abdomen soft, non-tender, no rebound, no guarding.  · MUSCULOSKELETAL: Spine appears normal, range of motion is not limited, no muscle or joint tenderness  · NEUROLOGICAL: - - -  · NEURO LOC: alert  follows commands  motor strength intact, sensation intact  · NEURO NECK: normal  · NEURO SPEECH: clear  · NEURO WEIGHT: ATAXIC  · NEURO LEG NORM: normal bilaterally      MEDICATIONS:  MEDICATIONS  (STANDING):  aspirin enteric coated 81 milliGRAM(s) Oral daily  atorvastatin 80 milliGRAM(s) Oral daily  cephalexin 500 milliGRAM(s) Oral two times a day  hydrochlorothiazide 12.5 milliGRAM(s) Oral daily  losartan 50 milliGRAM(s) Oral daily  metoprolol succinate  milliGRAM(s) Oral daily      LABS: All Labs Reviewed:                        12.8   8.21  )-----------( 310      ( 03 Feb 2019 05:50 )             41.2     02-03    139  |  102  |  21  ----------------------------<  117<H>  3.6   |  24  |  0.90    Ca    10.1      03 Feb 2019 05:50    TPro  7.1  /  Alb  4.0  /  TBili  0.6  /  DBili  x   /  AST  15  /  ALT  12  /  AlkPhos  59  02-03          Blood Culture:   Urine Culture      RADIOLOGY/EKG:  < from: MR Head No Cont (02.02.19 @ 22:06) >        PROCEDURE DATE:  Feb 2 2019         INTERPRETATION:  CLINICAL INFORMATION: Vertigo r/o posterior stroke    TECHNIQUE: MRI of the brain was performed without contrast. Sagittal and   axial T1, axial T2, FLAIR, SWI, diffusion-weighted images and an ADC map   were obtained.    COMPARISON: CTA of the head and neck earlier the same day, 2/2/2019.    FINDINGS:    Mild cerebral volume loss with proportional prominence of the sulci and   ventricles. There are foci of T2/FLAIR signal hyperintensity within the   hemispheric white matter, which are nonspecific but likely related to   sequelae of microvascular disease.    There is no intraparenchymal hematoma, mass effect or midline shift. No   abnormal extra-axial fluid collections are present. There is no diffusion   abnormality to suggest acute or subacute infarction. There is a partially   empty sella. Major flow-voids at the base of the brain follow expected   course and contour.    The calvarium is intact. There is a rightward orbital gaze preference,   there is a decreased size of the left lateral rectus orbital muscle belly   relative to the right side and left medial rectus, correlate with thyroid   function and or possible left sixth nerve denervation atrophy. There is   mucosal thickening with with left maxillary retention cysts/polyps and   ethmoid, frontal, and sphenoid mucosal thickening, mastoids are poorly   pneumatized.    IMPRESSION:    Mild volume loss, nonspecific foci of hyperintense T2 and FLAIRsignal   within the white matter likely microvascular disease or migraine sequela.   Partially empty sella. No restricted diffusion, hemorrhage or midline   shift.    Slight rightward orbital gaze preference with decreased size of the left   lateral rectus muscle belly relative to the right side and left medial   rectus, suggest correlation with thyroid function and or possible slight   left lateral rectus denervation atrophy.      < end of copied text >      ASSESSMENT AND PLAN:   DW  ER  team  and  PT  she  need admission  for  further treatment and  fall prevention.  DVT PPX:    ADVANCED DIRECTIVE:    DISPOSITION:

## 2019-02-03 NOTE — H&P ADULT - HISTORY OF PRESENT ILLNESS
63F hx of HTN presents to [facility] ED for dizziness. Since January 31 patient has not felt well. On that day she felt dizzy and lightheaded. She went to her PMD Dr. Jones who found patient to be BP in 220 range, and increased her Metoprolol from 50 to 100 mg and added losartan-hctz. Patient began medications and noticed BP improve to 130s/60s. However still had residual dizziness. She then went to the ED at [facility] where she worked, and she was discharged with a Norvasc script. Patient continued her usual activities dealing with dizziness. On Friday, 2/1 she went to her hairdresser. During that appointment patient's dizziness was so overbearing she couldn't see straight or walk, and had to be brought to an ER for evaluation. Was triaged to CDU for Neurologic workup.     Currently patient still with dizziness - she feels like she is "on a boat" as the room seems to be wavering. Otherwise no fevers, chills, chest pain, cough, nausea, vomiting, diarrhea, dysuria. Patient notes that the left eye for the last year has felt "hard to move." Unable to explain why but feels there is effort required for her left eye to move around.

## 2019-02-03 NOTE — H&P ADULT - FAMILY HISTORY
Mother  Still living? Unknown  Family history of Alzheimer's disease, Age at diagnosis: Age Unknown     Father  Still living? Unknown  Family history of substance abuse, Age at diagnosis: Age Unknown     Grandparent  Still living? Unknown  Family history of breast cancer, Age at diagnosis: Age Unknown

## 2019-02-03 NOTE — ED CDU PROVIDER SUBSEQUENT DAY NOTE - PROGRESS NOTE DETAILS
Pt objectively noted to be resting comfortably in the interim; no issues thus far.  Pt has not been able to get out of bed thus far due to worsening room-spinning dizziness that occurs with movement.  Pt has been resting in bed; no c/o in the interim.  Pt. stable at present; am labs due shortly.  MRI was completed last night; official report pending.  Of note, UA (checked x 2 with large leukocytes); pt being covered for UTI with Keflex; UCX sent / testing.  Pt made aware of results; agreeable with current overall treatment plan.  Pt. will be signed out to the day CDU PA (Mesfin) and attending (Dr. Cantor) at 0700 hrs. Pt signed out to me by REMI White pending MRI read. MRI with no acute findings discussed with neuro. Pt continues to have dizziness, unable to ambulate. Epley maneuver unsuccessful.  evaluated pt at bedside and accepted to his service, text paged MAR. Neuro aware pt is admitted.

## 2019-02-04 LAB
ALBUMIN SERPL ELPH-MCNC: 4 G/DL — SIGNIFICANT CHANGE UP (ref 3.3–5)
ALP SERPL-CCNC: 61 U/L — SIGNIFICANT CHANGE UP (ref 40–120)
ALT FLD-CCNC: 11 U/L — SIGNIFICANT CHANGE UP (ref 4–33)
ANION GAP SERPL CALC-SCNC: 10 MMO/L — SIGNIFICANT CHANGE UP (ref 7–14)
AST SERPL-CCNC: 10 U/L — SIGNIFICANT CHANGE UP (ref 4–32)
BACTERIA UR CULT: SIGNIFICANT CHANGE UP
BASOPHILS # BLD AUTO: 0.06 K/UL — SIGNIFICANT CHANGE UP (ref 0–0.2)
BASOPHILS NFR BLD AUTO: 0.7 % — SIGNIFICANT CHANGE UP (ref 0–2)
BILIRUB SERPL-MCNC: 0.5 MG/DL — SIGNIFICANT CHANGE UP (ref 0.2–1.2)
BUN SERPL-MCNC: 25 MG/DL — HIGH (ref 7–23)
CALCIUM SERPL-MCNC: 9.7 MG/DL — SIGNIFICANT CHANGE UP (ref 8.4–10.5)
CHLORIDE SERPL-SCNC: 102 MMOL/L — SIGNIFICANT CHANGE UP (ref 98–107)
CO2 SERPL-SCNC: 26 MMOL/L — SIGNIFICANT CHANGE UP (ref 22–31)
CREAT SERPL-MCNC: 0.79 MG/DL — SIGNIFICANT CHANGE UP (ref 0.5–1.3)
EOSINOPHIL # BLD AUTO: 0.07 K/UL — SIGNIFICANT CHANGE UP (ref 0–0.5)
EOSINOPHIL NFR BLD AUTO: 0.8 % — SIGNIFICANT CHANGE UP (ref 0–6)
GLUCOSE SERPL-MCNC: 105 MG/DL — HIGH (ref 70–99)
HCT VFR BLD CALC: 41 % — SIGNIFICANT CHANGE UP (ref 34.5–45)
HCV AB S/CO SERPL IA: 0.18 S/CO — SIGNIFICANT CHANGE UP
HCV AB SERPL-IMP: SIGNIFICANT CHANGE UP
HGB BLD-MCNC: 13.1 G/DL — SIGNIFICANT CHANGE UP (ref 11.5–15.5)
IMM GRANULOCYTES NFR BLD AUTO: 0.3 % — SIGNIFICANT CHANGE UP (ref 0–1.5)
LYMPHOCYTES # BLD AUTO: 1.98 K/UL — SIGNIFICANT CHANGE UP (ref 1–3.3)
LYMPHOCYTES # BLD AUTO: 23 % — SIGNIFICANT CHANGE UP (ref 13–44)
MAGNESIUM SERPL-MCNC: 2.1 MG/DL — SIGNIFICANT CHANGE UP (ref 1.6–2.6)
MCHC RBC-ENTMCNC: 28.2 PG — SIGNIFICANT CHANGE UP (ref 27–34)
MCHC RBC-ENTMCNC: 32 % — SIGNIFICANT CHANGE UP (ref 32–36)
MCV RBC AUTO: 88.2 FL — SIGNIFICANT CHANGE UP (ref 80–100)
MONOCYTES # BLD AUTO: 0.65 K/UL — SIGNIFICANT CHANGE UP (ref 0–0.9)
MONOCYTES NFR BLD AUTO: 7.5 % — SIGNIFICANT CHANGE UP (ref 2–14)
NEUTROPHILS # BLD AUTO: 5.83 K/UL — SIGNIFICANT CHANGE UP (ref 1.8–7.4)
NEUTROPHILS NFR BLD AUTO: 67.7 % — SIGNIFICANT CHANGE UP (ref 43–77)
NRBC # FLD: 0 K/UL — LOW (ref 25–125)
PHOSPHATE SERPL-MCNC: 3.9 MG/DL — SIGNIFICANT CHANGE UP (ref 2.5–4.5)
PLATELET # BLD AUTO: 293 K/UL — SIGNIFICANT CHANGE UP (ref 150–400)
PMV BLD: 11.2 FL — SIGNIFICANT CHANGE UP (ref 7–13)
POTASSIUM SERPL-MCNC: 4.1 MMOL/L — SIGNIFICANT CHANGE UP (ref 3.5–5.3)
POTASSIUM SERPL-SCNC: 4.1 MMOL/L — SIGNIFICANT CHANGE UP (ref 3.5–5.3)
PROT SERPL-MCNC: 7.1 G/DL — SIGNIFICANT CHANGE UP (ref 6–8.3)
RBC # BLD: 4.65 M/UL — SIGNIFICANT CHANGE UP (ref 3.8–5.2)
RBC # FLD: 15.2 % — HIGH (ref 10.3–14.5)
SODIUM SERPL-SCNC: 138 MMOL/L — SIGNIFICANT CHANGE UP (ref 135–145)
SPECIMEN SOURCE: SIGNIFICANT CHANGE UP
T4 AB SER-ACNC: 8.73 UG/DL — SIGNIFICANT CHANGE UP (ref 5.1–13)
TSH SERPL-MCNC: 1.75 UIU/ML — SIGNIFICANT CHANGE UP (ref 0.27–4.2)
VIT B12 SERPL-MCNC: 1207 PG/ML — HIGH (ref 200–900)
WBC # BLD: 8.62 K/UL — SIGNIFICANT CHANGE UP (ref 3.8–10.5)
WBC # FLD AUTO: 8.62 K/UL — SIGNIFICANT CHANGE UP (ref 3.8–10.5)

## 2019-02-04 RX ORDER — MECLIZINE HCL 12.5 MG
50 TABLET ORAL
Qty: 0 | Refills: 0 | Status: DISCONTINUED | OUTPATIENT
Start: 2019-02-04 | End: 2019-02-06

## 2019-02-04 RX ORDER — CLONAZEPAM 1 MG
0.5 TABLET ORAL
Qty: 0 | Refills: 0 | Status: DISCONTINUED | OUTPATIENT
Start: 2019-02-04 | End: 2019-02-07

## 2019-02-04 RX ADMIN — Medication 12.5 MILLIGRAM(S): at 07:10

## 2019-02-04 RX ADMIN — ATORVASTATIN CALCIUM 80 MILLIGRAM(S): 80 TABLET, FILM COATED ORAL at 13:09

## 2019-02-04 RX ADMIN — Medication 81 MILLIGRAM(S): at 13:09

## 2019-02-04 RX ADMIN — HEPARIN SODIUM 5000 UNIT(S): 5000 INJECTION INTRAVENOUS; SUBCUTANEOUS at 15:13

## 2019-02-04 RX ADMIN — HEPARIN SODIUM 5000 UNIT(S): 5000 INJECTION INTRAVENOUS; SUBCUTANEOUS at 07:09

## 2019-02-04 RX ADMIN — Medication 0.5 MILLIGRAM(S): at 17:52

## 2019-02-04 RX ADMIN — Medication 500 MILLIGRAM(S): at 07:09

## 2019-02-04 RX ADMIN — Medication 50 MILLIGRAM(S): at 17:52

## 2019-02-04 RX ADMIN — Medication 25 MILLIGRAM(S): at 07:09

## 2019-02-04 RX ADMIN — Medication 500 MILLIGRAM(S): at 17:52

## 2019-02-04 RX ADMIN — LOSARTAN POTASSIUM 50 MILLIGRAM(S): 100 TABLET, FILM COATED ORAL at 07:10

## 2019-02-04 NOTE — ED ADULT NURSE REASSESSMENT NOTE - FACIAL SYMMETRY
symmetrical

## 2019-02-04 NOTE — CHART NOTE - NSCHARTNOTEFT_GEN_A_CORE
MRI reviewed -ve for stroke.   If patient still symptomatic; would dose Clonazepam 0.5mg q12h standing for 2-3 days  Please check TSH, FT4, B12   Consider ENT consult to r/o ear path as cause of vertigo  agree w/ PT/OT eval        < from: MR Head No Cont (02.02.19 @ 22:06) >    IMPRESSION:    Mild volume loss, nonspecific foci of hyperintense T2 and FLAIR signal   within the white matter likely microvascular disease or migraine sequela.   Partially empty sella. No restricted diffusion, hemorrhage or midline   shift.    Slight rightward orbital gaze preference with decreased size of the left   lateral rectus muscle belly relative to the right side and left medial   rectus, suggest correlation with thyroid function and or possible slight   left lateral rectus denervation atrophy.    < end of copied text >      Spectra#[id number] MRI reviewed -ve for stroke.   If patient still symptomatic; would dose Clonazepam 0.5mg q12h standing for 2-3 days  Please check TSH, FT4, B12   agree w/ PT/OT eval    D/c planning:   - Please have pt follow up outpatient with Neurology 1 week of DC at [facility] located at  [address] [place] [address], [address] [place]; # [phone]   - ENT outpt  - Vestibular Rehab referral        < from: MR Head No Cont (02.02.19 @ 22:06) >    IMPRESSION:    Mild volume loss, nonspecific foci of hyperintense T2 and FLAIR signal   within the white matter likely microvascular disease or migraine sequela.   Partially empty sella. No restricted diffusion, hemorrhage or midline   shift.    Slight rightward orbital gaze preference with decreased size of the left   lateral rectus muscle belly relative to the right side and left medial   rectus, suggest correlation with thyroid function and or possible slight   left lateral rectus denervation atrophy.    < end of copied text >      Spectra#[id number]

## 2019-02-04 NOTE — ED ADULT NURSE REASSESSMENT NOTE - GLASGOW COMA SCALE: BEST VERBAL RESPONSE
(V5) oriented

## 2019-02-04 NOTE — OCCUPATIONAL THERAPY INITIAL EVALUATION ADULT - PLANNED THERAPY INTERVENTIONS, OT EVAL
neuromuscular re-education/bed mobility training/ROM/strengthening/balance training/transfer training/ADL retraining

## 2019-02-04 NOTE — ED ADULT NURSE REASSESSMENT NOTE - NEURO GAIT
not walking due to dizziness
requires assistance
requires assistance
requires assistance/dizzy
requires assistance/due to dizziness
requires assistance/dizzy
not walking due to dizziness

## 2019-02-04 NOTE — ED ADULT NURSE REASSESSMENT NOTE - GLASGOW COMA SCALE: BEST MOTOR RESPONSE
(M6) obeys commands

## 2019-02-04 NOTE — ED ADULT NURSE REASSESSMENT NOTE - NURSING NEURO LEVEL OF CONSCIOUSNESS
alert and awake

## 2019-02-04 NOTE — OCCUPATIONAL THERAPY INITIAL EVALUATION ADULT - PERTINENT HX OF CURRENT PROBLEM, REHAB EVAL
Pt is a 63 year old female with hx of HTN, who presented to [facility] on 2/3/19 with dizziness. CT Head on 2/2/19 displayed no acute hemorrhage or mass effect.

## 2019-02-04 NOTE — OCCUPATIONAL THERAPY INITIAL EVALUATION ADULT - MD ORDER
Occupational Therapy (OT) to evaluate and treat. Occupational Therapy (OT) to evaluate and treat. Per GARRETT LOONEY, pt is okay to participate in OT evaluation and perform activity as tolerated.

## 2019-02-04 NOTE — OCCUPATIONAL THERAPY INITIAL EVALUATION ADULT - LIVES WITH, PROFILE
Pt. reports she lives alone in an apartment building with no steps to enter. Once inside, pt. reports she has 6 steps to negotiate and then elevator available to reach her apartment. Per pt., she has a bathtub in her bathroom.

## 2019-02-04 NOTE — PROGRESS NOTE ADULT - SUBJECTIVE AND OBJECTIVE BOX
Neurology  Progress Note  02-04-19    Name:  HANY NUNEZ; 63y    Interval History:  Patient reports improvement of vertigo. Intermittent. Occasionally present on primary gaze, without head movement. Significantly exacerbated by Left lateral, cephalad movement.   Patient reports inability to process mothers death ~1yr ago. Family has 'turned on her' after death making resolution of grief difficult. Patient states 'maybe I should talk to a therapist' indicating desire for improvement.   Patient has no other current complaints.     HPI:  63y RH F PMH HTN p/w vertigo. LKW 1/31 0900. As per patient, on 1/31, started feeling unwell, had headache, nausea, dizziness. Went to PCP's office where her pressure was found to be 220/120; PCP increased pt's metoprolol succinate from 50 to 100mg and gave her a dose of losartan 50mg and HCTZ 12.5 mg. On 2/1 in the AM, patient continued to feel unwell, took 100mg metoprolol succinate and went to [facility] where her BP was once again found elevated; was given norvasc(dose unknown), felt better, and was discharged. Patient then went to [facility] when she felt worsening dizziness/vertigo(room spinning around her), called an ambulance which brought her to [facility].  (03 Feb 2019 15:08)    REVIEW OF SYSTEMS:  As states in HPI.    Medications:  aspirin enteric coated 81 milliGRAM(s) Oral daily  atorvastatin 80 milliGRAM(s) Oral daily  cephalexin 500 milliGRAM(s) Oral two times a day  diazepam    Tablet 5 milliGRAM(s) Oral every 6 hours PRN  diazepam    Tablet 2 milliGRAM(s) Oral Once  diazepam    Tablet 5 milliGRAM(s) Oral Once  heparin  Injectable 5000 Unit(s) SubCutaneous every 8 hours  hydrochlorothiazide 12.5 milliGRAM(s) Oral daily  LORazepam     Tablet 0.5 milliGRAM(s) Oral once  LORazepam   Injectable 0.5 milliGRAM(s) IV Push once  losartan 50 milliGRAM(s) Oral daily  meclizine 25 milliGRAM(s) Oral every 6 hours PRN  meclizine 25 milliGRAM(s) Oral Once  meclizine 25 milliGRAM(s) Oral Once  meclizine 25 milliGRAM(s) Oral Once  meclizine 25 milliGRAM(s) Oral Once  metoclopramide Injectable 10 milliGRAM(s) IV Push once  metoclopramide Injectable 10 milliGRAM(s) IV Push once  metoprolol succinate  milliGRAM(s) Oral daily  ondansetron Injectable 4 milliGRAM(s) IV Push every 4 hours PRN  ondansetron Injectable 4 milliGRAM(s) IV Push once  ondansetron Injectable 4 milliGRAM(s) IV Push once  potassium chloride    Tablet ER 40 milliEquivalent(s) Oral once  sodium chloride 0.9% Bolus 1000 milliLiter(s) IV Bolus once    Vitals:  T(C): 36.6 (02-04-19 @ 06:30), Max: 37 (02-03-19 @ 18:09)  HR: 73 (02-04-19 @ 06:30) (68 - 80)  BP: 127/60 (02-04-19 @ 06:30) (101/55 - 127/60)  RR: 16 (02-04-19 @ 06:30) (15 - 16)  SpO2: 98% (02-04-19 @ 06:30) (98% - 98%)    Labs:                        13.1   8.62  )-----------( 293      ( 04 Feb 2019 07:00 )             41.0     02-04    138  |  102  |  25<H>  ----------------------------<  105<H>  4.1   |  26  |  0.79    Ca    9.7      04 Feb 2019 07:00  Phos  3.9     02-04  Mg     2.1     02-04    TPro  7.1  /  Alb  4.0  /  TBili  0.5  /  DBili  x   /  AST  10  /  ALT  11  /  AlkPhos  61  02-04  LIVER FUNCTIONS - ( 04 Feb 2019 07:00 )  Alb: 4.0 g/dL / Pro: 7.1 g/dL / ALK PHOS: 61 u/L / ALT: 11 u/L / AST: 10 u/L / GGT: x         Culture - Urine (collected 03 Feb 2019 04:28)  Source: URINE MIDSTREAM  Preliminary Report (04 Feb 2019 09:21):    Culture grew 3 or more types of organisms which indicate    collection contamination; consider recollection only if    clinically indicated.      Neurological Examination:    Mental Status: Patient is alert, awake, oriented X3. Patient is fluent, no dysarthria, no aphasia. Follows commands well and able to answer questions appropriately. Mood and affect normal.    Cranial Nerves: PERRL, EOMI with dizziness that worsens when looking L + up, L beating nystagmus when looking left only, no skew deviation, L head thrust positive, visual field intact, V1-V3 intact, no gross facial asymmetry, tongue/uvula midline    Motor Exam: No drift  Right upper extremity: 5/5  Left upper extremity: 5/5  Right lower extremity: 5/5  Left lower extremity: 5/5    Normal bulk/tone    Sensory: Intact to light touch bilaterally. No extinction    Coordination: Finger to nose intact bilaterally     Reflexes: Bilateral 2+ Biceps, Brachial, Patellar, Ankle  Plantar: Down bilateral    GENERAL: No acute distress  HEENT:  Normocephalic, atraumatic  EXTREMITIES: No edema, clubbing, cyanosis  SKIN: No rashes    Radiology:  < from: MR Head No Cont (02.02.19 @ 22:06) >    IMPRESSION:    Mild volume loss, nonspecific foci of hyperintense T2 and FLAIR signal   within the white matter likely microvascular disease or migraine sequela.   Partially empty sella. No restricted diffusion, hemorrhage or midline   shift.    Slight rightward orbital gaze preference with decreased size of the left   lateral rectus muscle belly relative to the right side and left medial   rectus, suggest correlation with thyroid function and or possible slight   left lateral rectus denervation atrophy.    < end of copied text >

## 2019-02-04 NOTE — ED ADULT NURSE REASSESSMENT NOTE - NURSING NEURO ORIENTATION
oriented to person, place and time

## 2019-02-04 NOTE — PROGRESS NOTE ADULT - SUBJECTIVE AND OBJECTIVE BOX
CHIEF COMPLAINT: dizziness her symptoms did not improve, so patient was admitted for further evaluation.  Neurology followup appreciated  63F hx of HTN presents to [facility] ED for dizziness. Since January 31 patient has not felt well. On that day she felt dizzy and lightheaded. She was seen in the office1/31/19 prior to admission   found patient to be BP in 220 range, and increased her Metoprolol from 50 to 100 mg and added losartan-hctz. Patient began medications and noticed BP improve to 130s/60s. However still had residual dizziness. She then went to the ED at [facility] where she worked, and she was discharged with a Norvasc script. Patient continued her usual activities dealing with dizziness. On Friday, 2/1 she went to her hairdresser. During that appointment patient's dizziness was so overbearing she couldn't see straight or walk, and had to be brought to an ER for evaluation. Was triaged to CDU for Neurologic workup.     Currently patient still with dizziness - she feels like she is "on a boat" as the room seems to be wavering. Otherwise no fevers, chills, chest pain, cough, nausea, vomiting, diarrhea, dysuria. Patient notes that the left eye for the last year has felt "hard to move." Unable to explain why but feels there is effort required for her left eye to move around.   SUBJECTIVE:     REVIEW OF SYSTEMS:  CONSTITUTIONAL: No weakness, fevers or chills  	EYES/ENT: No visual changes;  No vertigo or throat pain   	NECK: No pain or stiffness  	RESPIRATORY: No cough, wheezing, hemoptysis; No shortness of breath  	CARDIOVASCULAR: No chest pain or palpitations  	GASTROINTESTINAL: No abdominal or epigastric pain. No nausea, vomiting, or hematemesis; No diarrhea or constipation. No melena or hematochezia.  	GENITOURINARY: No dysuria, frequency or hematuria  	NEUROLOGICAL: No numbness or weakness, (+) DIZZINESS   	SKIN: No itching, burning, rashes, or lesions   All other review of systems is negative unless indicated above.       Vital Signs Last 24 Hrs  T(C): 36.8 (04 Feb 2019 19:21), Max: 38.2 (04 Feb 2019 18:05)  T(F): 98.3 (04 Feb 2019 19:21), Max: 100.7 (04 Feb 2019 18:05)  HR: 84 (04 Feb 2019 18:05) (73 - 84)  BP: 118/65 (04 Feb 2019 18:05) (101/80 - 127/60)  BP(mean): --  RR: 16 (04 Feb 2019 18:05) (16 - 17)  SpO2: 97% (04 Feb 2019 18:05) (97% - 98%)    I&O's Summary      CAPILLARY BLOOD GLUCOSE          PHYSICAL EXAM:  General: NAD, non-toxic appearing, speaking in full sentences   	HEENT: EOMI, PERRLA, no conjunctival pallor, MMM, no JVD, no thyromegaly, neck supple, trachea midline  	CV: S1S2 RRR no MRG  	Lungs: CTA BL  	Abdomen: soft NTND +BS   	Extremities: No CCE +WWP  	Skin/MSK: No rashes, preserved ROM on active & passive movement  Neuro: AAOx3, nonfocal       MEDICATIONS:  MEDICATIONS  (STANDING):  aspirin enteric coated 81 milliGRAM(s) Oral daily  atorvastatin 80 milliGRAM(s) Oral daily  cephalexin 500 milliGRAM(s) Oral two times a day  clonazePAM Tablet 0.5 milliGRAM(s) Oral two times a day  heparin  Injectable 5000 Unit(s) SubCutaneous every 8 hours  hydrochlorothiazide 12.5 milliGRAM(s) Oral daily  losartan 50 milliGRAM(s) Oral daily  meclizine 50 milliGRAM(s) Oral two times a day  metoprolol succinate  milliGRAM(s) Oral daily      LABS: All Labs Reviewed:                        13.1   8.62  )-----------( 293      ( 04 Feb 2019 07:00 )             41.0     02-04    138  |  102  |  25<H>  ----------------------------<  105<H>  4.1   |  26  |  0.79    Ca    9.7      04 Feb 2019 07:00  Phos  3.9     02-04  Mg     2.1     02-04    TPro  7.1  /  Alb  4.0  /  TBili  0.5  /  DBili  x   /  AST  10  /  ALT  11  /  AlkPhos  61  02-04          Blood Culture: 02-03 @ 04:28  Organism --  Gram Stain Blood -- Gram Stain --  Specimen Source URINE MIDSTREAM  Culture-Blood --      Urine Culture      RADIOLOGY/EKG:  	< from: MR Head No Cont (02.02.19 @ 22:06) >  	Mild volume loss, nonspecific foci of hyperintense T2 and FLAIRsignal   	within the white matter likely microvascular disease or migraine sequela.   	Partially empty sella. No restricted diffusion, hemorrhage or midline   	shift.  	Slight rightward orbital gaze preference with decreased size of the left   	lateral rectus muscle belly relative to the right side and left medial   	rectus, suggest correlation with thyroid function and or possible slight   	left lateral rectus denervation atrophy.  ASSESSMENT AND PLAN:    63F being admitted for dizziness, unable to ambulate      Problem/Plan - 1:  ·  Problem: Vertigo.  Plan: Patient with persistent vertiginous symptoms despite 2 doses of meclizine. C/w Meclizine and Klonopin. MRI negative for acute stroke    - Appreciate  Neurology recommendations  - PT/OT consultation.     Problem/Plan - 2:  ·  Problem: Essential hypertension.  Plan: c/w home medications as previously prescribed.     Problem/Plan - 3:  ·  Problem: Abnormal urinalysis. Plan: Patient with abnormal UA, started on Keflex 500 PO BID. C/w abx, urine culture.still pending   DVT PPX:    ADVANCED DIRECTIVE:    DISPOSITION:DC home in am if  stable

## 2019-02-04 NOTE — ED ADULT NURSE REASSESSMENT NOTE - GLASGOW COMA SCALE: EYE OPENING
(E4) spontaneous

## 2019-02-04 NOTE — PROGRESS NOTE ADULT - ASSESSMENT
63y RH F PMH HTN p/w vertigo. NIHSS 0, MRS 0. PE w/ L beating nystagmus, dizziness worsens when looking L, head thrust +. Patient received multiple doses of meclizine and valium without relief of symptoms. HINTs exam + would indicate that vertigo is less likely 2/2 to stroke, more likely 2/2 to peripheral cause; however, given severity of symptoms without relief in the setting of acute hypertensive episodes, would be reasonable to obtain additional imaging studies to rule out neurovascular etiology of symptoms.     Likely peripheral etiology of vertigo, supported by MRI WNL, and a reassuring HiNTS examination.     Recommendations:  - If patient still symptomatic; would dose Clonazepam 0.5mg q12h standing for 2-3 days, in addition to Meclizine 25-100mg PO divide BID-TID.  - Please check TSH, FT4, B12   - agree w/ PT/OT eval for vestibular rehab.   - Consider Psych evaluation.    D/c planning:   - Please have pt follow up outpatient with Neurology within 1 week of DC at [facility] located at  [address] [place] [address], [address] [place]; Ph# [phone]   - ENT outpt  - Vestibular Rehab referral 63y RH F PMH HTN p/w vertigo. NIHSS 0, MRS 0. PE w/ L beating nystagmus, dizziness worsens when looking L, head thrust +. Patient received multiple doses of meclizine and valium without relief of symptoms. HINTs exam + would indicate that vertigo is less likely 2/2 to stroke, more likely 2/2 to peripheral cause; however, given severity of symptoms without relief in the setting of acute hypertensive episodes, would be reasonable to obtain additional imaging studies to rule out neurovascular etiology of symptoms.     Likely peripheral etiology of vertigo, supported by MRI WNL, and a reassuring HiNTS examination.     Recommendations:  - If patient still symptomatic; would dose Clonazepam 0.5mg q12h standing for 2-3 days, in addition to Meclizine 25mg PO divide BID-TID.  - Please check TSH, FT4, B12   - agree w/ PT/OT eval for vestibular rehab.   - Consider Psych evaluation.    D/c planning:   - Please have pt follow up outpatient with Neurology within 1 week of DC at [facility] located at  [address] [place] [address], [address] [place]; Ph# [phone]   - ENT outpt  - Vestibular Rehab referral for outpatient

## 2019-02-05 LAB
ALBUMIN SERPL ELPH-MCNC: 3.9 G/DL — SIGNIFICANT CHANGE UP (ref 3.3–5)
ALP SERPL-CCNC: 59 U/L — SIGNIFICANT CHANGE UP (ref 40–120)
ALT FLD-CCNC: 10 U/L — SIGNIFICANT CHANGE UP (ref 4–33)
ANION GAP SERPL CALC-SCNC: 11 MMO/L — SIGNIFICANT CHANGE UP (ref 7–14)
APPEARANCE UR: CLEAR — SIGNIFICANT CHANGE UP
AST SERPL-CCNC: 11 U/L — SIGNIFICANT CHANGE UP (ref 4–32)
BACTERIA # UR AUTO: SIGNIFICANT CHANGE UP
BASOPHILS # BLD AUTO: 0.07 K/UL — SIGNIFICANT CHANGE UP (ref 0–0.2)
BASOPHILS NFR BLD AUTO: 0.8 % — SIGNIFICANT CHANGE UP (ref 0–2)
BILIRUB SERPL-MCNC: 0.5 MG/DL — SIGNIFICANT CHANGE UP (ref 0.2–1.2)
BILIRUB UR-MCNC: NEGATIVE — SIGNIFICANT CHANGE UP
BLOOD UR QL VISUAL: NEGATIVE — SIGNIFICANT CHANGE UP
BUN SERPL-MCNC: 25 MG/DL — HIGH (ref 7–23)
CALCIUM SERPL-MCNC: 9.6 MG/DL — SIGNIFICANT CHANGE UP (ref 8.4–10.5)
CHLORIDE SERPL-SCNC: 100 MMOL/L — SIGNIFICANT CHANGE UP (ref 98–107)
CO2 SERPL-SCNC: 26 MMOL/L — SIGNIFICANT CHANGE UP (ref 22–31)
COLOR SPEC: YELLOW — SIGNIFICANT CHANGE UP
CREAT SERPL-MCNC: 0.9 MG/DL — SIGNIFICANT CHANGE UP (ref 0.5–1.3)
EOSINOPHIL # BLD AUTO: 0.06 K/UL — SIGNIFICANT CHANGE UP (ref 0–0.5)
EOSINOPHIL NFR BLD AUTO: 0.7 % — SIGNIFICANT CHANGE UP (ref 0–6)
GLUCOSE SERPL-MCNC: 105 MG/DL — HIGH (ref 70–99)
GLUCOSE UR-MCNC: NEGATIVE — SIGNIFICANT CHANGE UP
HCT VFR BLD CALC: 42.2 % — SIGNIFICANT CHANGE UP (ref 34.5–45)
HGB BLD-MCNC: 12.9 G/DL — SIGNIFICANT CHANGE UP (ref 11.5–15.5)
IMM GRANULOCYTES NFR BLD AUTO: 0.5 % — SIGNIFICANT CHANGE UP (ref 0–1.5)
KETONES UR-MCNC: NEGATIVE — SIGNIFICANT CHANGE UP
LEUKOCYTE ESTERASE UR-ACNC: SIGNIFICANT CHANGE UP
LYMPHOCYTES # BLD AUTO: 2.54 K/UL — SIGNIFICANT CHANGE UP (ref 1–3.3)
LYMPHOCYTES # BLD AUTO: 29.1 % — SIGNIFICANT CHANGE UP (ref 13–44)
MAGNESIUM SERPL-MCNC: 2.1 MG/DL — SIGNIFICANT CHANGE UP (ref 1.6–2.6)
MCHC RBC-ENTMCNC: 27.2 PG — SIGNIFICANT CHANGE UP (ref 27–34)
MCHC RBC-ENTMCNC: 30.6 % — LOW (ref 32–36)
MCV RBC AUTO: 88.8 FL — SIGNIFICANT CHANGE UP (ref 80–100)
MONOCYTES # BLD AUTO: 0.75 K/UL — SIGNIFICANT CHANGE UP (ref 0–0.9)
MONOCYTES NFR BLD AUTO: 8.6 % — SIGNIFICANT CHANGE UP (ref 2–14)
NEUTROPHILS # BLD AUTO: 5.27 K/UL — SIGNIFICANT CHANGE UP (ref 1.8–7.4)
NEUTROPHILS NFR BLD AUTO: 60.3 % — SIGNIFICANT CHANGE UP (ref 43–77)
NITRITE UR-MCNC: NEGATIVE — SIGNIFICANT CHANGE UP
NRBC # FLD: 0 K/UL — LOW (ref 25–125)
PH UR: 6 — SIGNIFICANT CHANGE UP (ref 5–8)
PHOSPHATE SERPL-MCNC: 4.4 MG/DL — SIGNIFICANT CHANGE UP (ref 2.5–4.5)
PLATELET # BLD AUTO: 279 K/UL — SIGNIFICANT CHANGE UP (ref 150–400)
PMV BLD: 12 FL — SIGNIFICANT CHANGE UP (ref 7–13)
POTASSIUM SERPL-MCNC: 4.2 MMOL/L — SIGNIFICANT CHANGE UP (ref 3.5–5.3)
POTASSIUM SERPL-SCNC: 4.2 MMOL/L — SIGNIFICANT CHANGE UP (ref 3.5–5.3)
PROT SERPL-MCNC: 6.9 G/DL — SIGNIFICANT CHANGE UP (ref 6–8.3)
PROT UR-MCNC: 30 — SIGNIFICANT CHANGE UP
RBC # BLD: 4.75 M/UL — SIGNIFICANT CHANGE UP (ref 3.8–5.2)
RBC # FLD: 15.2 % — HIGH (ref 10.3–14.5)
RBC CASTS # UR COMP ASSIST: SIGNIFICANT CHANGE UP (ref 0–?)
SODIUM SERPL-SCNC: 137 MMOL/L — SIGNIFICANT CHANGE UP (ref 135–145)
SP GR SPEC: 1.03 — SIGNIFICANT CHANGE UP (ref 1–1.04)
SQUAMOUS # UR AUTO: SIGNIFICANT CHANGE UP
UROBILINOGEN FLD QL: SIGNIFICANT CHANGE UP
WBC # BLD: 8.73 K/UL — SIGNIFICANT CHANGE UP (ref 3.8–10.5)
WBC # FLD AUTO: 8.73 K/UL — SIGNIFICANT CHANGE UP (ref 3.8–10.5)
WBC UR QL: HIGH (ref 0–?)

## 2019-02-05 RX ORDER — NYSTATIN CREAM 100000 [USP'U]/G
1 CREAM TOPICAL
Qty: 0 | Refills: 0 | Status: DISCONTINUED | OUTPATIENT
Start: 2019-02-05 | End: 2019-02-08

## 2019-02-05 RX ORDER — ACETAMINOPHEN 500 MG
650 TABLET ORAL ONCE
Qty: 0 | Refills: 0 | Status: COMPLETED | OUTPATIENT
Start: 2019-02-05 | End: 2019-02-05

## 2019-02-05 RX ADMIN — NYSTATIN CREAM 1 APPLICATION(S): 100000 CREAM TOPICAL at 17:12

## 2019-02-05 RX ADMIN — Medication 0.5 MILLIGRAM(S): at 17:13

## 2019-02-05 RX ADMIN — ATORVASTATIN CALCIUM 80 MILLIGRAM(S): 80 TABLET, FILM COATED ORAL at 13:12

## 2019-02-05 RX ADMIN — HEPARIN SODIUM 5000 UNIT(S): 5000 INJECTION INTRAVENOUS; SUBCUTANEOUS at 13:12

## 2019-02-05 RX ADMIN — NYSTATIN CREAM 1 APPLICATION(S): 100000 CREAM TOPICAL at 06:54

## 2019-02-05 RX ADMIN — HEPARIN SODIUM 5000 UNIT(S): 5000 INJECTION INTRAVENOUS; SUBCUTANEOUS at 06:55

## 2019-02-05 RX ADMIN — Medication 650 MILLIGRAM(S): at 01:14

## 2019-02-05 RX ADMIN — Medication 50 MILLIGRAM(S): at 17:13

## 2019-02-05 RX ADMIN — Medication 500 MILLIGRAM(S): at 07:36

## 2019-02-05 RX ADMIN — Medication 100 MILLIGRAM(S): at 06:54

## 2019-02-05 RX ADMIN — Medication 50 MILLIGRAM(S): at 06:54

## 2019-02-05 RX ADMIN — Medication 0.5 MILLIGRAM(S): at 06:53

## 2019-02-05 RX ADMIN — Medication 12.5 MILLIGRAM(S): at 06:53

## 2019-02-05 RX ADMIN — Medication 81 MILLIGRAM(S): at 13:12

## 2019-02-05 RX ADMIN — Medication 650 MILLIGRAM(S): at 00:48

## 2019-02-05 RX ADMIN — HEPARIN SODIUM 5000 UNIT(S): 5000 INJECTION INTRAVENOUS; SUBCUTANEOUS at 21:19

## 2019-02-05 NOTE — PROGRESS NOTE ADULT - SUBJECTIVE AND OBJECTIVE BOX
CHIEF COMPLAINT: Patient feels better stele lightheaded  most probably secondary to her hypotension/hhypertension dizziness her symptoms did not improve, so patient was admitted for further evaluation.  Neurology followup appreciated  63F hx of HTN presents to [facility] ED for dizziness. Since January 31 patient has not felt well. On that day she felt dizzy and lightheaded. She was seen in the office1/31/19 prior to admission   found patient to be BP in 220 range, and increased her Metoprolol from 50 to 100 mg and added losartan-hctz. Patient began medications and noticed BP improve to 130s/60s. However still had residual dizziness. She then went to the ED at [facility] where she worked, and she was discharged with a Norvasc script. Patient continued her usual activities dealing with dizziness. On Friday, 2/1 she went to her hairdresser. During that appointment patient's dizziness was so overbearing she couldn't see straight or walk, and had to be brought to an ER for evaluation. Was triaged to CDU for Neurologic workup.     Currently patient still with dizziness - she feels like she is "on a boat" as the room seems to be wavering. Otherwise no fevers, chills, chest pain, cough, nausea, vomiting, diarrhea, dysuria. Patient notes that the left eye for the last year has felt "hard to move." Unable to explain why but feels there is effort required for her left eye to move around.   SUBJECTIVE:     REVIEW OF SYSTEMS:  CONSTITUTIONAL: No weakness, fevers or chills  	EYES/ENT: No visual changes;  No vertigo or throat pain   	NECK: No pain or stiffness  	RESPIRATORY: No cough, wheezing, hemoptysis; No shortness of breath  	CARDIOVASCULAR: No chest pain or palpitations  	GASTROINTESTINAL: No abdominal or epigastric pain. No nausea, vomiting, or hematemesis; No diarrhea or constipation. No melena or hematochezia.  	GENITOURINARY: No dysuria, frequency or hematuria  	NEUROLOGICAL: No numbness or weakness, (+) DIZZINESS   	SKIN: No itching, burning, rashes, or lesions   All other review of systems is negative unless indicated above.    Vital Signs Last 24 Hrs  T(C): 36.6 (05 Feb 2019 09:00), Max: 38.2 (04 Feb 2019 18:05)  T(F): 97.8 (05 Feb 2019 09:00), Max: 100.7 (04 Feb 2019 18:05)  HR: 75 (05 Feb 2019 09:00) (75 - 98)  BP: 126/68 (05 Feb 2019 09:00) (102/64 - 126/68)  BP(mean): --  RR: 18 (05 Feb 2019 09:00) (16 - 18)  SpO2: 96% (05 Feb 2019 09:00) (96% - 98%)    I&O's Summary      CAPILLARY BLOOD GLUCOSE          PHYSICAL EXAM:    General: NAD, non-toxic appearing, speaking in full sentences   	HEENT: EOMI, PERRLA, no conjunctival pallor, MMM, no JVD, no thyromegaly, neck supple, trachea midline  	CV: S1S2 RRR no MRG  	Lungs: CTA BL  	Abdomen: soft NTND +BS   	Extremities: No CCE +WWP  	Skin/MSK: No rashes, preserved ROM on active & passive movement  Neuro: AAOx3, nonfocal       MEDICATIONS:  MEDICATIONS  (STANDING):  aspirin enteric coated 81 milliGRAM(s) Oral daily  atorvastatin 80 milliGRAM(s) Oral daily  clonazePAM Tablet 0.5 milliGRAM(s) Oral two times a day  heparin  Injectable 5000 Unit(s) SubCutaneous every 8 hours  meclizine 50 milliGRAM(s) Oral two times a day  metoprolol succinate  milliGRAM(s) Oral daily  nystatin Powder 1 Application(s) Topical two times a day      LABS: All Labs Reviewed:                        12.9   8.73  )-----------( 279      ( 05 Feb 2019 05:30 )             42.2     02-05    137  |  100  |  25<H>  ----------------------------<  105<H>  4.2   |  26  |  0.90    Ca    9.6      05 Feb 2019 05:30  Phos  4.4     02-05  Mg     2.1     02-05    TPro  6.9  /  Alb  3.9  /  TBili  0.5  /  DBili  x   /  AST  11  /  ALT  10  /  AlkPhos  59  02-05          Blood Culture: 02-03 @ 04:28  Organism --  Gram Stain Blood -- Gram Stain --  Specimen Source URINE MIDSTREAM  Culture-Blood --      Urine Culture      RADIOLOGY/EKG:    ASSESSMENT AND PLAN:  63F being admitted for dizziness, unable to ambulate      Problem/Plan - 1:  ·  Problem: Vertigo.  Plan: Patient with persistent vertiginous symptoms despite 2 doses of meclizine. C/w Meclizine and Klonopin. MRI negative for acute stroke    - Appreciate  Neurology recommendations  - PT/OT consultation.     Problem/Plan - 2:  ·  Problem: Essential hypertension.   Will discontinue hydrochlorothiazide and Cozaar secondary to soft BP and significant drop in her blood pressure from 220  to -120  systolic    Problem/Plan - 3:  ·  Problem: Abnormal urinalysis. Plan: Patient with abnormal UA,  Discontinue Keflex urine culture contaminated and repeat urine culture  DVT PPX:    ADVANCED DIRECTIVE:    DISPOSITION: disposition home in a.m. if remained stable

## 2019-02-06 ENCOUNTER — TRANSCRIPTION ENCOUNTER (OUTPATIENT)
Age: 64
End: 2019-02-06

## 2019-02-06 LAB
ALBUMIN SERPL ELPH-MCNC: 3.7 G/DL — SIGNIFICANT CHANGE UP (ref 3.3–5)
ALP SERPL-CCNC: 60 U/L — SIGNIFICANT CHANGE UP (ref 40–120)
ALT FLD-CCNC: 13 U/L — SIGNIFICANT CHANGE UP (ref 4–33)
ANION GAP SERPL CALC-SCNC: 11 MMO/L — SIGNIFICANT CHANGE UP (ref 7–14)
AST SERPL-CCNC: 14 U/L — SIGNIFICANT CHANGE UP (ref 4–32)
BASOPHILS # BLD AUTO: 0.05 K/UL — SIGNIFICANT CHANGE UP (ref 0–0.2)
BASOPHILS NFR BLD AUTO: 0.7 % — SIGNIFICANT CHANGE UP (ref 0–2)
BILIRUB SERPL-MCNC: 0.4 MG/DL — SIGNIFICANT CHANGE UP (ref 0.2–1.2)
BUN SERPL-MCNC: 21 MG/DL — SIGNIFICANT CHANGE UP (ref 7–23)
CALCIUM SERPL-MCNC: 9.4 MG/DL — SIGNIFICANT CHANGE UP (ref 8.4–10.5)
CHLORIDE SERPL-SCNC: 101 MMOL/L — SIGNIFICANT CHANGE UP (ref 98–107)
CO2 SERPL-SCNC: 25 MMOL/L — SIGNIFICANT CHANGE UP (ref 22–31)
CREAT SERPL-MCNC: 0.66 MG/DL — SIGNIFICANT CHANGE UP (ref 0.5–1.3)
EOSINOPHIL # BLD AUTO: 0.1 K/UL — SIGNIFICANT CHANGE UP (ref 0–0.5)
EOSINOPHIL NFR BLD AUTO: 1.4 % — SIGNIFICANT CHANGE UP (ref 0–6)
GLUCOSE SERPL-MCNC: 112 MG/DL — HIGH (ref 70–99)
HCT VFR BLD CALC: 38.6 % — SIGNIFICANT CHANGE UP (ref 34.5–45)
HGB BLD-MCNC: 12.3 G/DL — SIGNIFICANT CHANGE UP (ref 11.5–15.5)
IMM GRANULOCYTES NFR BLD AUTO: 0.3 % — SIGNIFICANT CHANGE UP (ref 0–1.5)
LYMPHOCYTES # BLD AUTO: 1.87 K/UL — SIGNIFICANT CHANGE UP (ref 1–3.3)
LYMPHOCYTES # BLD AUTO: 26.8 % — SIGNIFICANT CHANGE UP (ref 13–44)
MCHC RBC-ENTMCNC: 28 PG — SIGNIFICANT CHANGE UP (ref 27–34)
MCHC RBC-ENTMCNC: 31.9 % — LOW (ref 32–36)
MCV RBC AUTO: 87.7 FL — SIGNIFICANT CHANGE UP (ref 80–100)
MONOCYTES # BLD AUTO: 0.52 K/UL — SIGNIFICANT CHANGE UP (ref 0–0.9)
MONOCYTES NFR BLD AUTO: 7.4 % — SIGNIFICANT CHANGE UP (ref 2–14)
NEUTROPHILS # BLD AUTO: 4.43 K/UL — SIGNIFICANT CHANGE UP (ref 1.8–7.4)
NEUTROPHILS NFR BLD AUTO: 63.4 % — SIGNIFICANT CHANGE UP (ref 43–77)
NRBC # FLD: 0 K/UL — LOW (ref 25–125)
PLATELET # BLD AUTO: 270 K/UL — SIGNIFICANT CHANGE UP (ref 150–400)
PMV BLD: 11.4 FL — SIGNIFICANT CHANGE UP (ref 7–13)
POTASSIUM SERPL-MCNC: 3.4 MMOL/L — LOW (ref 3.5–5.3)
POTASSIUM SERPL-SCNC: 3.4 MMOL/L — LOW (ref 3.5–5.3)
PROT SERPL-MCNC: 7 G/DL — SIGNIFICANT CHANGE UP (ref 6–8.3)
RBC # BLD: 4.4 M/UL — SIGNIFICANT CHANGE UP (ref 3.8–5.2)
RBC # FLD: 14.8 % — HIGH (ref 10.3–14.5)
SODIUM SERPL-SCNC: 137 MMOL/L — SIGNIFICANT CHANGE UP (ref 135–145)
WBC # BLD: 6.99 K/UL — SIGNIFICANT CHANGE UP (ref 3.8–10.5)
WBC # FLD AUTO: 6.99 K/UL — SIGNIFICANT CHANGE UP (ref 3.8–10.5)

## 2019-02-06 RX ORDER — MECLIZINE HCL 12.5 MG
12.5 TABLET ORAL EVERY 8 HOURS
Qty: 0 | Refills: 0 | Status: DISCONTINUED | OUTPATIENT
Start: 2019-02-06 | End: 2019-02-08

## 2019-02-06 RX ORDER — POLYETHYLENE GLYCOL 3350 17 G/17G
17 POWDER, FOR SOLUTION ORAL ONCE
Qty: 0 | Refills: 0 | Status: COMPLETED | OUTPATIENT
Start: 2019-02-06 | End: 2019-02-06

## 2019-02-06 RX ORDER — POLYETHYLENE GLYCOL 3350 17 G/17G
17 POWDER, FOR SOLUTION ORAL AT BEDTIME
Qty: 0 | Refills: 0 | Status: DISCONTINUED | OUTPATIENT
Start: 2019-02-06 | End: 2019-02-08

## 2019-02-06 RX ORDER — POTASSIUM CHLORIDE 20 MEQ
40 PACKET (EA) ORAL ONCE
Qty: 0 | Refills: 0 | Status: COMPLETED | OUTPATIENT
Start: 2019-02-06 | End: 2019-02-06

## 2019-02-06 RX ORDER — DOCUSATE SODIUM 100 MG
100 CAPSULE ORAL THREE TIMES A DAY
Qty: 0 | Refills: 0 | Status: DISCONTINUED | OUTPATIENT
Start: 2019-02-06 | End: 2019-02-08

## 2019-02-06 RX ORDER — MECLIZINE HCL 12.5 MG
25 TABLET ORAL
Qty: 0 | Refills: 0 | Status: DISCONTINUED | OUTPATIENT
Start: 2019-02-06 | End: 2019-02-08

## 2019-02-06 RX ADMIN — Medication 50 MILLIGRAM(S): at 05:48

## 2019-02-06 RX ADMIN — HEPARIN SODIUM 5000 UNIT(S): 5000 INJECTION INTRAVENOUS; SUBCUTANEOUS at 13:19

## 2019-02-06 RX ADMIN — HEPARIN SODIUM 5000 UNIT(S): 5000 INJECTION INTRAVENOUS; SUBCUTANEOUS at 21:07

## 2019-02-06 RX ADMIN — ATORVASTATIN CALCIUM 80 MILLIGRAM(S): 80 TABLET, FILM COATED ORAL at 13:19

## 2019-02-06 RX ADMIN — Medication 12.5 MILLIGRAM(S): at 17:12

## 2019-02-06 RX ADMIN — Medication 81 MILLIGRAM(S): at 13:19

## 2019-02-06 RX ADMIN — NYSTATIN CREAM 1 APPLICATION(S): 100000 CREAM TOPICAL at 05:48

## 2019-02-06 RX ADMIN — NYSTATIN CREAM 1 APPLICATION(S): 100000 CREAM TOPICAL at 17:12

## 2019-02-06 RX ADMIN — Medication 40 MILLIEQUIVALENT(S): at 13:18

## 2019-02-06 RX ADMIN — Medication 0.5 MILLIGRAM(S): at 05:47

## 2019-02-06 RX ADMIN — Medication 0.5 MILLIGRAM(S): at 17:12

## 2019-02-06 RX ADMIN — Medication 100 MILLIGRAM(S): at 21:07

## 2019-02-06 RX ADMIN — HEPARIN SODIUM 5000 UNIT(S): 5000 INJECTION INTRAVENOUS; SUBCUTANEOUS at 05:47

## 2019-02-06 RX ADMIN — POLYETHYLENE GLYCOL 3350 17 GRAM(S): 17 POWDER, FOR SOLUTION ORAL at 13:18

## 2019-02-06 RX ADMIN — Medication 100 MILLIGRAM(S): at 13:21

## 2019-02-06 NOTE — DISCHARGE NOTE ADULT - PLAN OF CARE
symptoms control follow up outpatient with Neurology 1 week after discharge at [facility] located at  [address] [place] [address], [address] [place]; Ph# [phone] BP control Received keflex for 2.5 days but urine culture came back as contaminated and antibiotic was discontinued. BP under control Your blood pressure medications were adjusted. Continue medications as prescribed. follow up outpatient with Neurology 1 week after discharge at [facility] located at  [address] [place] [address], [address] [place]; Ph# [phone] Your blood pressure medications were adjusted. Continue current blood pressure medication regimen as directed. Monitor for any visual changes, headaches or dizziness.  Monitor blood pressure regularly.  Follow up with your PCP for further management for high blood pressure, please call to make appointment within 1 week of discharge

## 2019-02-06 NOTE — DISCHARGE NOTE ADULT - PROVIDER TOKENS
FREE:[LAST:[follow up outpatient with Neurology 1 week of DC at [facility] located at  [address] [place] [address], [address] [place]; Ph# [phone]],PHONE:[(   )    -],FAX:[(   )    -]] FREE:[LAST:[follow up outpatient with Neurology 1 week of DC at [facility] located at  [address] [place] [address], [address] [place]; Ph# [phone]],PHONE:[(   )    -],FAX:[(   )    -]],PROVIDER:[TOKEN:[[id number]]]

## 2019-02-06 NOTE — DISCHARGE NOTE ADULT - CARE PLAN
Principal Discharge DX:	Vertigo  Goal:	symptoms control  Assessment and plan of treatment:	follow up outpatient with Neurology 1 week after discharge at [facility] located at  [address] [place] [address], [facility] [address] [place]; # [phone]  Secondary Diagnosis:	Abnormal urinalysis  Secondary Diagnosis:	Essential hypertension  Goal:	BP control Principal Discharge DX:	Vertigo  Goal:	symptoms control  Assessment and plan of treatment:	follow up outpatient with Neurology 1 week after discharge at [facility] located at  [address] [place] [address], [address] [place]; # [phone]  Secondary Diagnosis:	Abnormal urinalysis  Assessment and plan of treatment:	Received keflex for 2.5 days but urine culture came back as contaminated and antibiotic was discontinued.  Secondary Diagnosis:	Essential hypertension  Goal:	BP under control  Assessment and plan of treatment:	Your blood pressure medications were adjusted. Principal Discharge DX:	Vertigo  Goal:	symptoms control  Assessment and plan of treatment:	Continue medications as prescribed. follow up outpatient with Neurology 1 week after discharge at [facility] located at  [address] [place] [address], [address] [place]; # [phone]  Secondary Diagnosis:	Abnormal urinalysis  Assessment and plan of treatment:	Received keflex for 2.5 days but urine culture came back as contaminated and antibiotic was discontinued.  Secondary Diagnosis:	Essential hypertension  Goal:	BP under control  Assessment and plan of treatment:	Your blood pressure medications were adjusted. Continue current blood pressure medication regimen as directed. Monitor for any visual changes, headaches or dizziness.  Monitor blood pressure regularly.  Follow up with your PCP for further management for high blood pressure, please call to make appointment within 1 week of discharge

## 2019-02-06 NOTE — DISCHARGE NOTE ADULT - ADDITIONAL INSTRUCTIONS
Please follow up outpatient with Neurology within 1 week of DC at [facility] located at  [address] [place] [address], [address] [place]; Ph# [phone].

## 2019-02-06 NOTE — DISCHARGE NOTE ADULT - PATIENT PORTAL LINK FT
You can access the [facility] Patient Portal, offered by [facility], by registering with the following website: [email or web address]

## 2019-02-06 NOTE — DISCHARGE NOTE ADULT - HOSPITAL COURSE
63F hx of HTN presents to [facility] ED for dizziness. Since January 31 patient has not felt well. On that day she felt dizzy and lightheaded. She went to her PMD Dr. Jones who found patient with BP in 220 range, and increased her Metoprolol from 50 to 100 mg and added losartan-hctz. Patient began medications and noticed BP improve to 130s/60s. However still had residual dizziness. She then went to the ED at [facility] where she worked, and she was discharged with a Norvasc script. Patient continued her usual activities dealing with dizziness. On Friday, 2/1 she went to her hairdresser. During that appointment patient's dizziness was so overbearing she couldn't see straight or walk, and had to be brought to an ER for evaluation. Was triaged to CDU for Neurologic workup. She feels like she is "on a boat" as the room seems to be wavering. Otherwise no fevers, chills, chest pain, cough, nausea, vomiting, diarrhea, dysuria. Patient notes that the left eye for the last year has felt "hard to move." Unable to explain why but feels there is effort required for her left eye to move around.     Dizziness  - neurology was consulted  - Likely peripheral etiology of vertigo, supported by MRI WNL, and a reassuring HiNTS examination.   - meclizine       Essential hypertension.    hydrochlorothiazide and Cozaar dcd secondary to soft BP on admission  restarted metoprolol at half dose per dr Jones.   Monitor Blood pressure.     Abnormal urinalysis  UCx contaminated->Keflex 500 PO BID Dcd     PT  eval: home with home PT. Rolling walker. 63F hx of HTN presents to [facility] ED for dizziness. Since January 31 patient has not felt well. On that day she felt dizzy and lightheaded. She went to her PMD Dr. Jones who found patient with BP in 220 range, and increased her Metoprolol from 50 to 100 mg and added losartan-hctz. Patient began medications and noticed BP improve to 130s/60s. However still had residual dizziness. She then went to the ED at [facility] where she worked, and she was discharged with a Norvasc script. Patient continued her usual activities dealing with dizziness. On Friday, 2/1 she went to her hairdresser. During that appointment patient's dizziness was so overbearing she couldn't see straight or walk, and had to be brought to an ER for evaluation. Was triaged to CDU for Neurologic workup. She feels like she is "on a boat" as the room seems to be wavering. Otherwise no fevers, chills, chest pain, cough, nausea, vomiting, diarrhea, dysuria. Patient notes that the left eye for the last year has felt "hard to move." Unable to explain why but feels there is effort required for her left eye to move around.     Dizziness  - neurology was consulted  - Likely peripheral etiology of vertigo, supported by MRI WNL, and a reassuring HiNTS examination.   - meclizine      Essential hypertension.    hydrochlorothiazide and Cozaar dcd secondary to soft BP on admission  restarted metoprolol at half dose per dr Jones.   Monitor Blood pressure.     Abnormal urinalysis  UCx contaminated->Keflex 500 PO BID Dcd     PT  eval: home with home PT. Rolling walker.     ISTOP ref: [id number] 63F hx of HTN presents to [facility] ED for dizziness. Since January 31 patient has not felt well. On that day she felt dizzy and lightheaded. She went to her PMD Dr. Jones who found patient with BP in 220 range, and increased her Metoprolol from 50 to 100 mg and added losartan-hctz. Patient began medications and noticed BP improve to 130s/60s. However still had residual dizziness. She then went to the ED at [facility] where she worked, and she was discharged with a Norvasc script. Patient continued her usual activities dealing with dizziness. On Friday, 2/1 she went to her hairdresser. During that appointment patient's dizziness was so overbearing she couldn't see straight or walk, and had to be brought to an ER for evaluation. Was triaged to CDU for Neurologic workup. She feels like she is "on a boat" as the room seems to be wavering. Otherwise no fevers, chills, chest pain, cough, nausea, vomiting, diarrhea, dysuria. Patient notes that the left eye for the last year has felt "hard to move." Unable to explain why but feels there is effort required for her left eye to move around.     Dizziness  - neurology was consulted  - Likely peripheral etiology of vertigo, supported by MRI WNL, and a reassuring HiNTS examination.   - meclizine  - s/p 3 days of klonopin, continue upon d/c per Dr. Jones-- ISTOP Ref: [id number]-- no rx's filled in past 12 months per ISTOP, sent 3 day supply   -outpatient neuro f/u       Essential hypertension.    hydrochlorothiazide and Cozaar dcd secondary to soft BP on admission  restarted metoprolol at half dose per dr Jones.   Monitor Blood pressure.     Abnormal urinalysis  UCx contaminated->Keflex 500 PO BID Dcd     PT  eval: home with home PT. Rolling walker.     ISTOP ref: [id number]

## 2019-02-06 NOTE — PROGRESS NOTE ADULT - SUBJECTIVE AND OBJECTIVE BOX
marin   CHIEF COMPLAINT: Patient feels better stele lightheaded  most probably secondary to her hypotension/hhypertension dizziness her symptoms did not improve, so patient was admitted for further evaluation.  Neurology followup appreciated  63F hx of HTN presents to [facility] ED for dizziness. Since January 31 patient has not felt well. On that day she felt dizzy and lightheaded. She was seen in the office1/31/19 prior to admission   found patient to be BP in 220 range, and increased her Metoprolol from 50 to 100 mg and added losartan-hctz. Patient began medications and noticed BP improve to 130s/60s. However still had residual dizziness. She then went to the ED at [facility] where she worked, and she was discharged with a Norvasc script. Patient continued her usual activities dealing with dizziness. On Friday, 2/1 she went to her hairdresser. During that appointment patient's dizziness was so overbearing she couldn't see straight or walk, and had to be brought to an ER for evaluation. Was triaged to CDU for Neurologic workup.     Currently patient still with dizziness - she feels like she is "on a boat" as the room seems to be wavering. Otherwise no fevers, chills, chest pain, cough, nausea, vomiting, diarrhea, dysuria. Patient notes that the left eye for the last year has felt "hard to move." Unable to explain why but feels there is effort required for her left eye to move around.   SUBJECTIVE:     REVIEW OF SYSTEMS:    CONSTITUTIONAL: (x  )  weakness,  (  ) fevers or chills  EYES/ENT: (  )visual changes;     NECK: (  ) pain or stiffness  RESPIRATORY:   (  )cough, wheezing, hemoptysis;  (  ) shortness of breath  CARDIOVASCULAR:  (  )chest pain or palpitations  GASTROINTESTINAL:   (  )abdominal or epigastric pain.  (  ) nausea, vomiting, or hematemesis;   (   ) diarrhea or constipation.   GENITOURINARY:   (    ) dysuria, frequency or hematuria  NEUROLOGICAL:  (   ) numbness or weakness   All other review of systems is negative unless indicated above    Vital Signs Last 24 Hrs  T(C): 36.5 (06 Feb 2019 21:02), Max: 36.5 (06 Feb 2019 05:42)  T(F): 97.7 (06 Feb 2019 21:02), Max: 97.7 (06 Feb 2019 05:42)  HR: 78 (06 Feb 2019 21:02) (62 - 78)  BP: 132/71 (06 Feb 2019 21:02) (107/67 - 132/71)  BP(mean): --  RR: 18 (06 Feb 2019 21:02) (18 - 18)  SpO2: 97% (06 Feb 2019 21:02) (97% - 99%)    I&O's Summary      CAPILLARY BLOOD GLUCOSE          PHYSICAL EXAM:     General: NAD, non-toxic appearing, speaking in full sentences   	HEENT: EOMI, PERRLA, no conjunctival pallor, MMM, no JVD, no thyromegaly, neck supple, trachea midline  	CV: S1S2 RRR no MRG  	Lungs: CTA BL  	Abdomen: soft NTND +BS   	Extremities: No CCE +WWP  	Skin/MSK: No rashes, preserved ROM on active & passive movement  Neuro: AAOx3, nonfocal    MEDICATIONS:  MEDICATIONS  (STANDING):  aspirin enteric coated 81 milliGRAM(s) Oral daily  atorvastatin 80 milliGRAM(s) Oral daily  clonazePAM Tablet 0.5 milliGRAM(s) Oral two times a day  docusate sodium 100 milliGRAM(s) Oral three times a day  heparin  Injectable 5000 Unit(s) SubCutaneous every 8 hours  meclizine 25 milliGRAM(s) Oral two times a day  nystatin Powder 1 Application(s) Topical two times a day      LABS: All Labs Reviewed:                        12.3   6.99  )-----------( 270      ( 06 Feb 2019 07:15 )             38.6     02-06    137  |  101  |  21  ----------------------------<  112<H>  3.4<L>   |  25  |  0.66    Ca    9.4      06 Feb 2019 07:15  Phos  4.4     02-05  Mg     2.1     02-05    TPro  7.0  /  Alb  3.7  /  TBili  0.4  /  DBili  x   /  AST  14  /  ALT  13  /  AlkPhos  60  02-06          Blood Culture: 02-03 @ 04:28  Organism --  Gram Stain Blood -- Gram Stain --  Specimen Source URINE MIDSTREAM  Culture-Blood --      Urine Culture      RADIOLOGY/EKG:    ASSESSMENT AND PLAN:  63F being admitted for dizziness, unable to ambulate      Problem/Plan - 1:  ·  Problem: Vertigo.  Plan: Patient with persistent vertiginous symptoms despite 2 doses of meclizine. C/w Meclizine and Klonopin. MRI negative for acute stroke    - Appreciate  Neurology recommendations  - PT/OT consultation.     Problem/Plan - 2:  ·  Problem: Essential hypertension.   Will discontinue all of her antihypertensive meds , a blood pressure is still low in a.m. Will try to start IV fluid    Problem/Plan - 3:  ·  Problem: Abnormal urinalysis. Plan: Patient with abnormal UA,  Discontinue Keflex urine culture contaminated and repeat urine culture  DVT PPX:    ADVANCED DIRECTIVE:    DISPOSITION: disposition home  when stable  DVT PPX:    ADVANCED DIRECTIVE:    DISPOSITION:

## 2019-02-06 NOTE — DISCHARGE NOTE ADULT - MEDICATION SUMMARY - MEDICATIONS TO TAKE
I will START or STAY ON the medications listed below when I get home from the hospital:    aspirin 81 mg oral delayed release tablet  -- 1 tab(s) by mouth once a day  -- Indication: For Prophylactic measure    meclizine 25 mg oral tablet  -- 1 tab(s) by mouth 2 times a day  -- Indication: For Vertigo    atorvastatin 80 mg oral tablet  -- 1 tab(s) by mouth once a day  -- Indication: For Hyperlipidemia    metoprolol succinate 50 mg oral tablet, extended release  -- 1 tab(s) by mouth once a day  -- Indication: For Hypertension    nystatin 100,000 units/g topical powder  -- 1 application on skin 2 times a day  -- Indication: For Skin care    docusate sodium 100 mg oral capsule  -- 1 cap(s) by mouth 3 times a day  -- Indication: For Stool softener    polyethylene glycol 3350 oral powder for reconstitution  -- 17 gram(s) by mouth once a day (at bedtime), As needed, Constipation  -- Indication: For Constipation I will START or STAY ON the medications listed below when I get home from the hospital:    aspirin 81 mg oral delayed release tablet  -- 1 tab(s) by mouth once a day  -- Indication: For Prophylactic measure    KlonoPIN 0.5 mg oral tablet  -- 1 tab(s) (0.5 mg) by mouth every 12 hours MDD:2 tab, hold for oversedation/lethargy   -- Caution federal law prohibits the transfer of this drug to any person other  than the person for whom it was prescribed.  Do not drink alcoholic beverages when taking this medication.  Do not take this drug if you are pregnant.  It is very important that you take or use this exactly as directed.  Do not skip doses or discontinue unless directed by your doctor.  May cause drowsiness.  Alcohol may intensify this effect.  Use care when operating dangerous machinery.  Obtain medical advice before taking any non-prescription drugs as some may affect the action of this medication.  This drug may impair the ability to drive or operate machinery.  Use care until you become familiar with its effects.    -- Indication: For Vertigo    meclizine 25 mg oral tablet  -- 1 tab(s) by mouth 2 times a day  -- Indication: For Vertigo    atorvastatin 80 mg oral tablet  -- 1 tab(s) by mouth once a day  -- Indication: For Hyperlipidemia    metoprolol succinate 50 mg oral tablet, extended release  -- 1 tab(s) by mouth once a day  -- Indication: For Hypertension    nystatin 100,000 units/g topical powder  -- 1 application on skin 2 times a day  -- Indication: For Skin care    docusate sodium 100 mg oral capsule  -- 1 cap(s) by mouth 3 times a day  -- Indication: For Stool softener    polyethylene glycol 3350 oral powder for reconstitution  -- 17 gram(s) by mouth once a day (at bedtime), As needed, Constipation  -- Indication: For Constipation

## 2019-02-06 NOTE — DISCHARGE NOTE ADULT - CARE PROVIDER_API CALL
follow up outpatient with Neurology 1 week of DC at [facility] located at  [address] [place] [address], [address] [place]; Ph# [phone],   Phone: (   )    -  Fax: (   )    -  Follow Up Time: follow up outpatient with Neurology 1 week of DC at [facility] located at  [address] [place] [address], [address] [place]; Ph# [phone],   Phone: (   )    -  Fax: (   )    -  Follow Up Time:     [place] [id number]  Phone: [phone]  Fax: [phone]  Follow Up Time:

## 2019-02-07 LAB
ANION GAP SERPL CALC-SCNC: 9 MMO/L — SIGNIFICANT CHANGE UP (ref 7–14)
BUN SERPL-MCNC: 16 MG/DL — SIGNIFICANT CHANGE UP (ref 7–23)
CALCIUM SERPL-MCNC: 9.6 MG/DL — SIGNIFICANT CHANGE UP (ref 8.4–10.5)
CHLORIDE SERPL-SCNC: 103 MMOL/L — SIGNIFICANT CHANGE UP (ref 98–107)
CO2 SERPL-SCNC: 30 MMOL/L — SIGNIFICANT CHANGE UP (ref 22–31)
CREAT SERPL-MCNC: 0.71 MG/DL — SIGNIFICANT CHANGE UP (ref 0.5–1.3)
GLUCOSE SERPL-MCNC: 102 MG/DL — HIGH (ref 70–99)
MAGNESIUM SERPL-MCNC: 2.1 MG/DL — SIGNIFICANT CHANGE UP (ref 1.6–2.6)
PHOSPHATE SERPL-MCNC: 3.7 MG/DL — SIGNIFICANT CHANGE UP (ref 2.5–4.5)
POTASSIUM SERPL-MCNC: 4.6 MMOL/L — SIGNIFICANT CHANGE UP (ref 3.5–5.3)
POTASSIUM SERPL-SCNC: 4.6 MMOL/L — SIGNIFICANT CHANGE UP (ref 3.5–5.3)
SODIUM SERPL-SCNC: 142 MMOL/L — SIGNIFICANT CHANGE UP (ref 135–145)

## 2019-02-07 RX ORDER — MECLIZINE HCL 12.5 MG
1 TABLET ORAL
Qty: 60 | Refills: 0 | OUTPATIENT
Start: 2019-02-07 | End: 2019-03-08

## 2019-02-07 RX ORDER — ATORVASTATIN CALCIUM 80 MG/1
1 TABLET, FILM COATED ORAL
Qty: 0 | Refills: 0 | COMMUNITY
Start: 2019-02-07

## 2019-02-07 RX ORDER — METOPROLOL TARTRATE 50 MG
50 TABLET ORAL DAILY
Qty: 0 | Refills: 0 | Status: DISCONTINUED | OUTPATIENT
Start: 2019-02-07 | End: 2019-02-08

## 2019-02-07 RX ORDER — METOPROLOL TARTRATE 50 MG
1 TABLET ORAL
Qty: 30 | Refills: 0 | OUTPATIENT
Start: 2019-02-07 | End: 2019-03-08

## 2019-02-07 RX ORDER — ASPIRIN/CALCIUM CARB/MAGNESIUM 324 MG
1 TABLET ORAL
Qty: 0 | Refills: 0 | COMMUNITY
Start: 2019-02-07

## 2019-02-07 RX ORDER — METOPROLOL TARTRATE 50 MG
1 TABLET ORAL
Qty: 0 | Refills: 0 | COMMUNITY

## 2019-02-07 RX ORDER — POLYETHYLENE GLYCOL 3350 17 G/17G
17 POWDER, FOR SOLUTION ORAL
Qty: 0 | Refills: 0 | COMMUNITY
Start: 2019-02-07

## 2019-02-07 RX ORDER — LOSARTAN/HYDROCHLOROTHIAZIDE 100MG-25MG
1 TABLET ORAL
Qty: 0 | Refills: 0 | COMMUNITY

## 2019-02-07 RX ORDER — DOCUSATE SODIUM 100 MG
1 CAPSULE ORAL
Qty: 0 | Refills: 0 | COMMUNITY
Start: 2019-02-07

## 2019-02-07 RX ORDER — NYSTATIN CREAM 100000 [USP'U]/G
1 CREAM TOPICAL
Qty: 1 | Refills: 0 | OUTPATIENT
Start: 2019-02-07 | End: 2019-02-20

## 2019-02-07 RX ORDER — NYSTATIN CREAM 100000 [USP'U]/G
1 CREAM TOPICAL
Qty: 0 | Refills: 0 | COMMUNITY
Start: 2019-02-07

## 2019-02-07 RX ORDER — METOPROLOL TARTRATE 50 MG
1 TABLET ORAL
Qty: 0 | Refills: 0 | COMMUNITY
Start: 2019-02-07

## 2019-02-07 RX ORDER — MECLIZINE HCL 12.5 MG
1 TABLET ORAL
Qty: 0 | Refills: 0 | COMMUNITY
Start: 2019-02-07

## 2019-02-07 RX ORDER — ATORVASTATIN CALCIUM 80 MG/1
1 TABLET, FILM COATED ORAL
Qty: 30 | Refills: 0 | OUTPATIENT
Start: 2019-02-07 | End: 2019-03-08

## 2019-02-07 RX ADMIN — Medication 100 MILLIGRAM(S): at 21:36

## 2019-02-07 RX ADMIN — Medication 12.5 MILLIGRAM(S): at 18:21

## 2019-02-07 RX ADMIN — HEPARIN SODIUM 5000 UNIT(S): 5000 INJECTION INTRAVENOUS; SUBCUTANEOUS at 13:01

## 2019-02-07 RX ADMIN — Medication 25 MILLIGRAM(S): at 05:56

## 2019-02-07 RX ADMIN — NYSTATIN CREAM 1 APPLICATION(S): 100000 CREAM TOPICAL at 05:56

## 2019-02-07 RX ADMIN — HEPARIN SODIUM 5000 UNIT(S): 5000 INJECTION INTRAVENOUS; SUBCUTANEOUS at 21:36

## 2019-02-07 RX ADMIN — ATORVASTATIN CALCIUM 80 MILLIGRAM(S): 80 TABLET, FILM COATED ORAL at 13:03

## 2019-02-07 RX ADMIN — Medication 25 MILLIGRAM(S): at 18:21

## 2019-02-07 RX ADMIN — NYSTATIN CREAM 1 APPLICATION(S): 100000 CREAM TOPICAL at 18:20

## 2019-02-07 RX ADMIN — Medication 100 MILLIGRAM(S): at 13:03

## 2019-02-07 RX ADMIN — Medication 0.5 MILLIGRAM(S): at 05:56

## 2019-02-07 RX ADMIN — Medication 100 MILLIGRAM(S): at 05:56

## 2019-02-07 RX ADMIN — Medication 81 MILLIGRAM(S): at 13:03

## 2019-02-07 RX ADMIN — HEPARIN SODIUM 5000 UNIT(S): 5000 INJECTION INTRAVENOUS; SUBCUTANEOUS at 05:56

## 2019-02-07 NOTE — PROGRESS NOTE ADULT - SUBJECTIVE AND OBJECTIVE BOX
CHIEF COMPLAINT: Patient feeling better today the lightheadedness is less    SUBJECTIVE:     REVIEW OF SYSTEMS:    CONSTITUTIONAL: (  )  weakness,  (  ) fevers or chills  EYES/ENT: (  )visual changes;     NECK: (  ) pain or stiffness  RESPIRATORY:   (  )cough, wheezing, hemoptysis;  (  ) shortness of breath  CARDIOVASCULAR:  (  )chest pain or palpitations  GASTROINTESTINAL:   (  )abdominal or epigastric pain.  (  ) nausea, vomiting, or hematemesis;   (   ) diarrhea or constipation.   GENITOURINARY:   (    ) dysuria, frequency or hematuria  NEUROLOGICAL:  (   ) numbness or weakness   All other review of systems is negative unless indicated above    Vital Signs Last 24 Hrs  T(C): 36.7 (07 Feb 2019 13:03), Max: 36.7 (07 Feb 2019 05:43)  T(F): 98 (07 Feb 2019 13:03), Max: 98 (07 Feb 2019 05:43)  HR: 80 (07 Feb 2019 17:47) (74 - 80)  BP: 136/71 (07 Feb 2019 17:47) (131/71 - 143/83)  BP(mean): --  RR: 18 (07 Feb 2019 13:03) (18 - 18)  SpO2: 99% (07 Feb 2019 13:03) (96% - 99%)    I&O's Summary      CAPILLARY BLOOD GLUCOSE          PHYSICAL EXAM:     General: NAD, non-toxic appearing, speaking in full sentences   	HEENT: EOMI, PERRLA, no conjunctival pallor, MMM, no JVD, no thyromegaly, neck supple, trachea midline  	CV: S1S2 RRR no MRG  	Lungs: CTA BL  	Abdomen: soft NTND +BS   	Extremities: No CCE +WWP  	Skin/MSK: No rashes, preserved ROM on active & passive movement  Neuro: AAOx3, nonfocal    MEDICATIONS:  MEDICATIONS  (STANDING):  aspirin enteric coated 81 milliGRAM(s) Oral daily  atorvastatin 80 milliGRAM(s) Oral daily  docusate sodium 100 milliGRAM(s) Oral three times a day  heparin  Injectable 5000 Unit(s) SubCutaneous every 8 hours  meclizine 25 milliGRAM(s) Oral two times a day  metoprolol succinate ER 50 milliGRAM(s) Oral daily  nystatin Powder 1 Application(s) Topical two times a day      LABS: All Labs Reviewed:                        12.3   6.99  )-----------( 270      ( 06 Feb 2019 07:15 )             38.6     02-07    142  |  103  |  16  ----------------------------<  102<H>  4.6   |  30  |  0.71    Ca    9.6      07 Feb 2019 05:53  Phos  3.7     02-07  Mg     2.1     02-07    TPro  7.0  /  Alb  3.7  /  TBili  0.4  /  DBili  x   /  AST  14  /  ALT  13  /  AlkPhos  60  02-06          Blood Culture: 02-03 @ 04:28  Organism --  Gram Stain Blood -- Gram Stain --  Specimen Source URINE MIDSTREAM  Culture-Blood --      Urine Culture      RADIOLOGY/EKG:    ASSESSMENT AND PLAN:  63F being admitted for dizziness, unable to ambulate      Problem/Plan - 1:  ·  Problem: Vertigo.  Plan: Patient with persistent vertiginous symptoms despite 2 doses of meclizine. C/w Meclizine and Klonopin. MRI negative for acute stroke    - Appreciate  Neurology recommendations  - PT/OT consultation.     Problem/Plan - 2:  ·  Problem: Essential hypertension.   Will  Start low dose Lopressor 50 mg daily    Problem/Plan - 3:  ·  Problem: Abnormal urinalysis. Plan: Patient with abnormal UA,  Discontinue Keflex urine culture contaminated and repeat urine culture  DVT PPX:    ADVANCED DIRECTIVE:    DISPOSITION: disposition home  in  am  DVT PPX:    ADVANCED DIRECTIVE:    DISPOSITION:

## 2019-02-08 VITALS
RESPIRATION RATE: 18 BRPM | HEART RATE: 70 BPM | TEMPERATURE: 99 F | OXYGEN SATURATION: 100 % | SYSTOLIC BLOOD PRESSURE: 120 MMHG | DIASTOLIC BLOOD PRESSURE: 69 MMHG

## 2019-02-08 RX ORDER — CLONAZEPAM 1 MG
1 TABLET ORAL
Qty: 6 | Refills: 0 | OUTPATIENT
Start: 2019-02-08 | End: 2019-02-10

## 2019-02-08 RX ADMIN — HEPARIN SODIUM 5000 UNIT(S): 5000 INJECTION INTRAVENOUS; SUBCUTANEOUS at 05:59

## 2019-02-08 RX ADMIN — Medication 25 MILLIGRAM(S): at 05:59

## 2019-02-08 RX ADMIN — Medication 81 MILLIGRAM(S): at 11:29

## 2019-02-08 RX ADMIN — Medication 50 MILLIGRAM(S): at 05:59

## 2019-02-08 RX ADMIN — NYSTATIN CREAM 1 APPLICATION(S): 100000 CREAM TOPICAL at 05:59

## 2019-02-08 RX ADMIN — Medication 100 MILLIGRAM(S): at 05:59

## 2019-02-08 RX ADMIN — ATORVASTATIN CALCIUM 80 MILLIGRAM(S): 80 TABLET, FILM COATED ORAL at 11:29

## 2019-02-08 NOTE — PROGRESS NOTE ADULT - SUBJECTIVE AND OBJECTIVE BOX
CHIEF COMPLAINT:  patient condition improving  SUBJECTIVE:     REVIEW OF SYSTEMS:    CONSTITUTIONAL: (  )  weakness,  (  ) fevers or chills  EYES/ENT: (  )visual changes;     NECK: (  ) pain or stiffness  RESPIRATORY:   (  )cough, wheezing, hemoptysis;  (  ) shortness of breath  CARDIOVASCULAR:  (  )chest pain or palpitations  GASTROINTESTINAL:   (  )abdominal or epigastric pain.  (  ) nausea, vomiting, or hematemesis;   (   ) diarrhea or constipation.   GENITOURINARY:   (    ) dysuria, frequency or hematuria  NEUROLOGICAL:  (   ) numbness or weakness   All other review of systems is negative unless indicated above    Vital Signs Last 24 Hrs  T(C): 37.2 (08 Feb 2019 12:31), Max: 37.3 (07 Feb 2019 21:22)  T(F): 98.9 (08 Feb 2019 12:31), Max: 99.1 (07 Feb 2019 21:22)  HR: 70 (08 Feb 2019 12:31) (70 - 87)  BP: 120/69 (08 Feb 2019 12:31) (120/69 - 132/77)  BP(mean): --  RR: 18 (08 Feb 2019 12:31) (18 - 18)  SpO2: 100% (08 Feb 2019 12:31) (93% - 100%)    I&O's Summary      CAPILLARY BLOOD GLUCOSE          PHYSICAL EXAM:    Constitutional:  (  x ) NAD,   (   )awake and alert  HEENT: PERR, EOMI,    Neck: Soft and supple, No LAD, No JVD  Respiratory:  (   x Breath sounds are clear bilaterally,    (   ) wheezing, rales or rhonchi  Cardiovascular:     ( x  )S1 and S2, regular rate and rhythm, no Murmurs, gallops or rubs  Gastrointestinal:  ( x  )Bowel Sounds present, soft,   (  )nontender, nondistended,    Extremities:    (  ) peripheral edema  Vascular: 2+ peripheral pulses  Neurological:    ( x   )A/O x 3,   ( x ) focal deficits  Musculoskeletal:    (   )  normal strength b/l upper  (     ) normal  lower extremities  Skin: No rashes    MEDICATIONS:  MEDICATIONS  (STANDING):  aspirin enteric coated 81 milliGRAM(s) Oral daily  atorvastatin 80 milliGRAM(s) Oral daily  docusate sodium 100 milliGRAM(s) Oral three times a day  heparin  Injectable 5000 Unit(s) SubCutaneous every 8 hours  meclizine 25 milliGRAM(s) Oral two times a day  metoprolol succinate ER 50 milliGRAM(s) Oral daily  nystatin Powder 1 Application(s) Topical two times a day      LABS: All Labs Reviewed:    02-07    142  |  103  |  16  ----------------------------<  102<H>  4.6   |  30  |  0.71    Ca    9.6      07 Feb 2019 05:53  Phos  3.7     02-07  Mg     2.1     02-07            Blood Culture:   Urine Culture      RADIOLOGY/EKG:    ASSESSMENT AND PLAN:  patient condition stable to be dischaeg home with outpatient physical therapy will only restart her Lopressor 50 mg will not restart losartan and hydrochlo   Patient will be discharged with mecl home   following  in office in 4 days  DVT PPX:    ADVANCED DIRECTIVE:    DISPOSITION: home today.  Patient was seen earlier this a.m.

## 2019-02-15 ENCOUNTER — EMERGENCY (EMERGENCY)
Facility: HOSPITAL | Age: 64
LOS: 1 days | Discharge: HOME | End: 2019-02-15
Attending: EMERGENCY MEDICINE | Admitting: EMERGENCY MEDICINE
Payer: COMMERCIAL

## 2019-02-15 VITALS
SYSTOLIC BLOOD PRESSURE: 149 MMHG | HEART RATE: 63 BPM | DIASTOLIC BLOOD PRESSURE: 73 MMHG | RESPIRATION RATE: 14 BRPM | OXYGEN SATURATION: 99 %

## 2019-02-15 VITALS
SYSTOLIC BLOOD PRESSURE: 180 MMHG | DIASTOLIC BLOOD PRESSURE: 105 MMHG | RESPIRATION RATE: 16 BRPM | OXYGEN SATURATION: 98 % | HEART RATE: 86 BPM | TEMPERATURE: 98 F

## 2019-02-15 DIAGNOSIS — Z98.890 OTHER SPECIFIED POSTPROCEDURAL STATES: Chronic | ICD-10-CM

## 2019-02-15 DIAGNOSIS — S37.591A: Chronic | ICD-10-CM

## 2019-02-15 PROBLEM — I10 ESSENTIAL (PRIMARY) HYPERTENSION: Chronic | Status: ACTIVE | Noted: 2019-02-01

## 2019-02-15 PROBLEM — F40.240 CLAUSTROPHOBIA: Chronic | Status: ACTIVE | Noted: 2019-02-02

## 2019-02-15 PROCEDURE — 99283 EMERGENCY DEPT VISIT LOW MDM: CPT

## 2019-02-15 NOTE — ED PROVIDER NOTE - PROGRESS NOTE DETAILS
Skye MCCARTHY: Paged Dr. Jones for discussion about patient's BP medications. Skye MCCARTHY: Robert MCCARTHY came and assessed the patient.  Recommended continue taking the 50mg Metoprolol daily and add the Losartan to her regimen (has medications at home).

## 2019-02-15 NOTE — ED ADULT NURSE NOTE - OBJECTIVE STATEMENT
63 year old female presents to the ER with c/o vertigo and high blood pressure with a "slight" headache. Pt is alert and oriented x4, well appearing, in no distress. Pt laughing and making jokes. Pt states she was recently discharged from the hospital for her vertigo 6 days ago. Pt has no neuro deficits. MAEX4   Cardiac monitor on, SR noted rate 60, no ectopy B/P 149/73  Plan of care discussed with pt will continue to monitor

## 2019-02-15 NOTE — ED PROVIDER NOTE - OBJECTIVE STATEMENT
63 year-old female with history of HTN, vertigo presents to the Emergency Department for HTN.  Patient has had an elevated BP for the past few weeks req. medication changes  Patient mentions she woke up this AM; took her BP and it was 190 63 year-old female with history of HTN, vertigo presents to the Emergency Department for HTN.  Patient has had an elevated BP for the past few weeks req. medication changes and had a recent admission for HTN and vertigo.  Patient mentions she woke up this AM; took her BP and it was 190. 63 year-old female with history of HTN, vertigo presents to the Emergency Department for HTN.  Patient has had an elevated BP for the past few weeks req. medication changes and had a recent admission for HTN and vertigo.  Patient was d/c on her Losartan and adv to continue taking 50mg Metoprolol QD.  Patient mentions she woke up this AM; took her BP and it was 190 and took a dose of Losartan she had at home.  No fevers, chills, nausea, vomiting, chest pain, shortness of breath, abdominal pain.  + HA (frontal) typically has it when she becomes hypertensive.  Baseline vertigo symptoms - positional related; took home Meclizine in ER.  Patient feels well at this time.

## 2019-02-15 NOTE — ED PROVIDER NOTE - PHYSICAL EXAMINATION
*Gen: NAD, AAO*3  *HEENT: NC/AT, MMM, airway patent, trachea midline  *CV: RRR, S1/S2 present, no murmurs/rubs/gallops  *Resp: no respiratory distress, LCTAB, no wheezing/rales/rhonchi  *Abd: non-distended, soft N/Tx4, no guarding or rigidity  *Neuro: CN II-XII intact, no focal neurological deficits, motor and sensory intact bilaterally, 5/5 strength in all extremities, cerebellar coordination WNL  *Extremities: no gross deformity  *Skin: no rashes, no wounds   ~ Arlin Cheung M.D.

## 2019-02-15 NOTE — ED ADULT TRIAGE NOTE - CHIEF COMPLAINT QUOTE
Pt c/o high blood pressure since yesterday.  Spoke to MD and was directed to take BP med but pt awoke with BP still high.  Was discharged Friday from here with vertigo.  C/O dizziness since Friday.  Says it feels the same as her vertigo.  Denies CP/SOB

## 2019-02-15 NOTE — ED PROVIDER NOTE - NSFOLLOWUPINSTRUCTIONS_ED_ALL_ED_FT
Follow-up with your Primary Care Physician within 24-48 hours.  Please return to the Emergency Department immediately for any new, worsening or concerning symptoms; specifically those included in the attached information brochure.    Please continue taking your 50mg Metoprolol as prescribed.  Take Losartan, as prescribed by primary care doctor, daily.

## 2019-02-15 NOTE — ED PROVIDER NOTE - ATTENDING CONTRIBUTION TO CARE
63F h/o HTN presents with elevated blood pressure. Recent admission for dizziness with negative neuro workup, had BP meds changed during the hospitalization, diagnosed w vertigo. Ongoing dizziness today, so she took her blood pressure and found it to be elevated. Took extra meds and came to ED. Had a similar episode last night and took extra meds. No CP/SOB, no vision change. On exam well appearing, nad, mmm, lungs clear, rrr, abd soft, NT/ND, no rash, no edema, 2+ pulses, no focal neuro deficits. Asymptomatic HTN. Extensive conversation about BP monitoring at home and concerning symptoms. PCP Dr. Jones, who saw pt in ED. Made recs for med changes, will f/u in clinic on Monday.

## 2019-02-15 NOTE — CHART NOTE - NSCHARTNOTEFT_GEN_A_CORE
CHIEF COMPLAINT:  patient well known to me her blood pressure was elevated so sh initially was 190/90 now is 148/80 she is on Lopressor 50 mg, Hyzaar 50/12.5.  Her medication was adjusted on previous admission  th she was admitted for vertigo  SUBJECTIVE:     REVIEW OF SYSTEMS: negative    CONSTITUTIONAL: (  )  weakness,  (  ) fevers or chills  EYES/ENT: (  )visual changes;     NECK: (  ) pain or stiffness  RESPIRATORY:   (  )cough, wheezing, hemoptysis;  (  ) shortness of breath  CARDIOVASCULAR:  (  )chest pain or palpitations  GASTROINTESTINAL:   (  )abdominal or epigastric pain.  (  ) nausea, vomiting, or hematemesis;   (   ) diarrhea or constipation.   GENITOURINARY:   (    ) dysuria, frequency or hematuria  NEUROLOGICAL:  (   ) numbness or weakness   All other review of systems is negative unless indicated above    Vital Signs Last 24 Hrs  T(C): 36.9 (15 Feb 2019 09:42), Max: 36.9 (15 Feb 2019 09:42)  T(F): 98.5 (15 Feb 2019 09:42), Max: 98.5 (15 Feb 2019 09:42)  HR: 63 (15 Feb 2019 10:30) (63 - 86)  BP: 149/73 (15 Feb 2019 10:30) (149/73 - 180/105)  BP(mean): --  RR: 14 (15 Feb 2019 10:30) (14 - 16)  SpO2: 99% (15 Feb 2019 10:30) (98% - 99%)    I&O's Summary      CAPILLARY BLOOD GLUCOSE          PHYSICAL EXAM:    Constitutional:  (   x) NAD,   (   )awake and alert  HEENT: PERR, EOMI,    Neck: Soft and supple, No LAD, No JVD  Respiratory:  (  x  Breath sounds are clear bilaterally,    (   ) wheezing, rales or rhonchi  Cardiovascular:     (  x )S1 and S2, regular rate and rhythm, no Murmurs, gallops or rubs  Gastrointestinal:  (  x )Bowel Sounds present, soft,   ( x )nontender, nondistended,    Extremities:    (  ) peripheral edema  Vascular: 2+ peripheral pulses  Neurological:    (  x  )A/O x 3,   (  ) focal deficits  Musculoskeletal:    ( x  )  normal strength b/l upper  (  x   ) normal  lower extremities  Skin: No rashes    MEDICATIONS:  MEDICATIONS  (STANDING):      LABS: All Labs Reviewed:                Blood Culture:   Urine Culture      RADIOLOGY/EKG:    ASSESSMENT AND PLAN:   elevated blood pressure with long-standing history of, well controlled with oral medication and followup in the office next week.  Continue Lopressor 50 mg at 4 PM and Hyzaar 50/12  in a.m. if her blood pressure remained high i.  She can take an extra dose at p.m.  DVT PPX:    ADVANCED DIRECTIVE:     DISPOSITION: discussed with the patient and  emergency room team in detail

## 2019-02-15 NOTE — ED PROVIDER NOTE - CLINICAL SUMMARY MEDICAL DECISION MAKING FREE TEXT BOX
63 year-old female with history of HTN p/w HTN; now controlled to the 140s w/o ER intervention. 63 year-old female with history of HTN p/w HTN; now controlled to the 140s w/o ER intervention.  Unlikely ACS - plan to discuss with patient's PCP for HTN medication management.  No indication for blood work, imaging at this time.

## 2022-01-09 NOTE — ED ADULT NURSE NOTE - IN THE PAST 12 MONTHS HAVE YOU USED DRUGS OTHER THAN THOSE REQUIRED FOR MEDICAL REASON?
Pt with pain radiating from umbilical area to right lower side. Pt also with nausea and vomiting. Pt states pain started about 1.5 hours prior to arrival. Pt was tested for COVID and Flu yesterday.     Hattie Avalos RN
No

## 2022-03-02 NOTE — ED PROVIDER NOTE - DISCHARGE DATE
15-Feb-2019 no abdominal pain, no bloating, no constipation, no diarrhea, no nausea and no vomiting.

## 2023-02-23 ENCOUNTER — APPOINTMENT (OUTPATIENT)
Dept: ORTHOPEDIC SURGERY | Facility: CLINIC | Age: 68
End: 2023-02-23
Payer: MEDICARE

## 2023-02-23 DIAGNOSIS — M16.0 BILATERAL PRIMARY OSTEOARTHRITIS OF HIP: ICD-10-CM

## 2023-02-23 DIAGNOSIS — M43.16 SPONDYLOLISTHESIS, LUMBAR REGION: ICD-10-CM

## 2023-02-23 DIAGNOSIS — M70.61 TROCHANTERIC BURSITIS, RIGHT HIP: ICD-10-CM

## 2023-02-23 DIAGNOSIS — M54.17 RADICULOPATHY, LUMBOSACRAL REGION: ICD-10-CM

## 2023-02-23 PROBLEM — Z00.00 ENCOUNTER FOR PREVENTIVE HEALTH EXAMINATION: Status: ACTIVE | Noted: 2023-02-23

## 2023-02-23 PROCEDURE — 99204 OFFICE O/P NEW MOD 45 MIN: CPT

## 2023-02-23 PROCEDURE — 72170 X-RAY EXAM OF PELVIS: CPT

## 2023-02-23 RX ORDER — DICLOFENAC SODIUM 1% 10 MG/G
1 GEL TOPICAL
Qty: 100 | Refills: 2 | Status: ACTIVE | COMMUNITY
Start: 2023-02-23 | End: 1900-01-01

## 2023-02-23 RX ORDER — NAPROXEN 500 MG/1
500 TABLET ORAL
Qty: 60 | Refills: 0 | Status: ACTIVE | COMMUNITY
Start: 2023-02-23 | End: 1900-01-01

## 2023-02-23 RX ORDER — GABAPENTIN 100 MG/1
100 CAPSULE ORAL
Qty: 60 | Refills: 1 | Status: ACTIVE | COMMUNITY
Start: 2023-02-23 | End: 1900-01-01

## 2023-02-23 NOTE — IMAGING
[Outside films reviewed] : Outside films reviewed [Facet arthropathy] : Facet arthropathy [Disc space narrowing] : Disc space narrowing [AP] : anteroposterior [Moderate arthritis (Tonnis Grade 2)] : Moderate arthritis (Tonnis Grade 2) [de-identified] : LSPINE\par Inspection: No rash or ecchymosis\par Palpation: Midline lumbar tenderness. No tenderness to palpation or spasm in bilateral thoracic and lumbar paraspinal musculature, no SI joint tenderness to palpation\par ROM: diminished all planes\par Strength: 5/5 bilateral hip flexors, knee extensors, ankle dorsiflexors, EHL, ankle plantarflexors\par Sensation: Sensation present to light touch bilateral L2-S1 distributions\par Provocative maneuvers: Negative bilateral straight leg raise\par \par Bilateral hips-\par Palpation: R GT TTP\par ROM: R lateral hip/groin  pain with flexion and internal rotation\par \par  [FreeTextEntry1] : stepwise grade I spondylolisthesis L4-5 and L5-S1 [de-identified] : Moderate bilateral hip OA, R>L

## 2023-02-23 NOTE — ASSESSMENT
[FreeTextEntry1] : bilateral hip OA, R>L\par L4-5 and L5-S1 spondylolisthesis with RLE radic\par \par PT, meds.topicals\par f/u 6 weeks\par consider imaging if no improvement\par \par Gabapentin- Patient advised of sedating effects, instructed not to drive, operate machinery, or take with other sedating medications. Advised of need to taper on/off medication and risk of abruptly stopping gabapentin.\par \par NSAIDs- Patient warned of risk of medication to GI tract, increased blood pressure, cardiac risk, and risk of fluid retention.  Advised to clear medication with internist or PCP if any concurrent health problem with heart, blood pressure, or GI system exists.\par \par \par Patient seen by Nona Patten PA-C, with and under the supervision of  Dr. Parish Bang M.D.\par \par

## 2023-04-06 ENCOUNTER — APPOINTMENT (OUTPATIENT)
Dept: ORTHOPEDIC SURGERY | Facility: CLINIC | Age: 68
End: 2023-04-06

## 2023-12-29 NOTE — ED CDU PROVIDER SUBSEQUENT DAY NOTE - ENMT NEGATIVE STATEMENT, MLM
Ears: no ear pain and no hearing problems. Nose: no nasal congestion and no nasal drainage. Mouth/Throat: no dysphagia, no hoarseness and no throat pain.Neck: no lumps, no pain, no stiffness and no swollen glands
Attending Attestation (For Attendings USE Only)...

## 2025-05-29 ENCOUNTER — RESULT REVIEW (OUTPATIENT)
Age: 70
End: 2025-05-29

## 2025-05-30 ENCOUNTER — TRANSCRIPTION ENCOUNTER (OUTPATIENT)
Age: 70
End: 2025-05-30

## 2025-05-31 ENCOUNTER — TRANSCRIPTION ENCOUNTER (OUTPATIENT)
Age: 70
End: 2025-05-31